# Patient Record
Sex: MALE | Race: WHITE | Employment: UNEMPLOYED | URBAN - METROPOLITAN AREA
[De-identification: names, ages, dates, MRNs, and addresses within clinical notes are randomized per-mention and may not be internally consistent; named-entity substitution may affect disease eponyms.]

---

## 2020-09-04 PROCEDURE — 88307 TISSUE EXAM BY PATHOLOGIST: CPT | Performed by: SURGERY

## 2020-09-08 PROBLEM — K42.9 UMBILICAL HERNIA WITHOUT OBSTRUCTION AND WITHOUT GANGRENE: Status: ACTIVE | Noted: 2020-09-08

## 2021-02-21 PROBLEM — Z00.00 ROUTINE GENERAL MEDICAL EXAMINATION AT A HEALTH CARE FACILITY: Status: ACTIVE | Noted: 2021-02-21

## 2021-02-21 PROBLEM — K62.5 RECTAL BLEEDING: Status: ACTIVE | Noted: 2021-02-21

## 2022-08-25 ENCOUNTER — HOSPITAL ENCOUNTER (OUTPATIENT)
Dept: CT IMAGING | Age: 37
Discharge: HOME | End: 2022-08-25
Attending: INTERNAL MEDICINE
Payer: COMMERCIAL

## 2022-08-25 ENCOUNTER — LAB ENCOUNTER (OUTPATIENT)
Dept: LAB | Age: 37
End: 2022-08-25
Attending: INTERNAL MEDICINE
Payer: COMMERCIAL

## 2022-08-25 ENCOUNTER — HOSPITAL ENCOUNTER (OUTPATIENT)
Facility: HOSPITAL | Age: 37
Setting detail: OBSERVATION
Discharge: HOME | End: 2022-08-26
Attending: EMERGENCY MEDICINE | Admitting: STUDENT IN AN ORGANIZED HEALTH CARE EDUCATION/TRAINING PROGRAM
Payer: COMMERCIAL

## 2022-08-25 ENCOUNTER — OFFICE VISIT (OUTPATIENT)
Dept: INTERNAL MEDICINE CLINIC | Facility: CLINIC | Age: 37
End: 2022-08-25
Payer: COMMERCIAL

## 2022-08-25 VITALS
WEIGHT: 224.81 LBS | TEMPERATURE: 99 F | HEART RATE: 58 BPM | RESPIRATION RATE: 16 BRPM | DIASTOLIC BLOOD PRESSURE: 74 MMHG | HEIGHT: 64 IN | SYSTOLIC BLOOD PRESSURE: 122 MMHG | OXYGEN SATURATION: 98 % | BODY MASS INDEX: 38.38 KG/M2

## 2022-08-25 DIAGNOSIS — R79.89 ELEVATED LFTS: ICD-10-CM

## 2022-08-25 DIAGNOSIS — R10.9 ABDOMINAL PAIN, UNSPECIFIED ABDOMINAL LOCATION: Primary | ICD-10-CM

## 2022-08-25 DIAGNOSIS — R10.9 ABDOMINAL PAIN, UNSPECIFIED ABDOMINAL LOCATION: ICD-10-CM

## 2022-08-25 DIAGNOSIS — R73.03 PREDIABETES: ICD-10-CM

## 2022-08-25 DIAGNOSIS — K37 APPENDICITIS: ICD-10-CM

## 2022-08-25 DIAGNOSIS — K35.30 ACUTE APPENDICITIS WITH LOCALIZED PERITONITIS, WITHOUT PERFORATION, ABSCESS, OR GANGRENE: Primary | ICD-10-CM

## 2022-08-25 PROBLEM — K62.5 RECTAL BLEEDING: Status: RESOLVED | Noted: 2021-02-21 | Resolved: 2022-08-25

## 2022-08-25 PROBLEM — K35.80 ACUTE APPENDICITIS: Status: ACTIVE | Noted: 2022-08-25

## 2022-08-25 PROBLEM — Z00.00 ROUTINE GENERAL MEDICAL EXAMINATION AT A HEALTH CARE FACILITY: Status: RESOLVED | Noted: 2021-02-21 | Resolved: 2022-08-25

## 2022-08-25 PROBLEM — K42.9 UMBILICAL HERNIA WITHOUT OBSTRUCTION AND WITHOUT GANGRENE: Status: RESOLVED | Noted: 2020-09-08 | Resolved: 2022-08-25

## 2022-08-25 LAB
ALBUMIN SERPL-MCNC: 3.9 G/DL (ref 3.4–5)
ALBUMIN/GLOB SERPL: 1 {RATIO} (ref 1–2)
ALP LIVER SERPL-CCNC: 107 U/L
ALT SERPL-CCNC: 43 U/L
ANION GAP SERPL CALC-SCNC: 2 MMOL/L (ref 0–18)
ANTIBODY SCREEN: NEGATIVE
APPEARANCE: CLEAR
AST SERPL-CCNC: 22 U/L (ref 15–37)
BASOPHILS # BLD AUTO: 0.08 X10(3) UL (ref 0–0.2)
BASOPHILS NFR BLD AUTO: 0.7 %
BILIRUB SERPL-MCNC: 0.7 MG/DL (ref 0.1–2)
BILIRUBIN: NEGATIVE
BUN BLD-MCNC: 12 MG/DL (ref 7–18)
CALCIUM BLD-MCNC: 8.9 MG/DL (ref 8.5–10.1)
CARTRIDGE LOT#: 978 NUMERIC
CHLORIDE SERPL-SCNC: 109 MMOL/L (ref 98–112)
CO2 SERPL-SCNC: 28 MMOL/L (ref 21–32)
CREAT BLD-MCNC: 1.15 MG/DL
EOSINOPHIL # BLD AUTO: 0.43 X10(3) UL (ref 0–0.7)
EOSINOPHIL NFR BLD AUTO: 3.7 %
ERYTHROCYTE [DISTWIDTH] IN BLOOD BY AUTOMATED COUNT: 13.2 %
FASTING STATUS PATIENT QL REPORTED: NO
GFR SERPLBLD BASED ON 1.73 SQ M-ARVRAT: 84 ML/MIN/1.73M2 (ref 60–?)
GLOBULIN PLAS-MCNC: 4 G/DL (ref 2.8–4.4)
GLUCOSE (URINE DIPSTICK): NEGATIVE MG/DL
GLUCOSE BLD-MCNC: 98 MG/DL (ref 70–99)
HCT VFR BLD AUTO: 43.8 %
HEMOGLOBIN A1C: 5.9 % (ref 4.3–5.6)
HGB BLD-MCNC: 14.7 G/DL
IMM GRANULOCYTES # BLD AUTO: 0.05 X10(3) UL (ref 0–1)
IMM GRANULOCYTES NFR BLD: 0.4 %
INR BLD: 1.07 (ref 0.85–1.16)
KETONES (URINE DIPSTICK): NEGATIVE MG/DL
LEUKOCYTES: NEGATIVE
LIPASE SERPL-CCNC: 106 U/L (ref 73–393)
LYMPHOCYTES # BLD AUTO: 3.31 X10(3) UL (ref 1–4)
LYMPHOCYTES NFR BLD AUTO: 28.6 %
MCH RBC QN AUTO: 31.9 PG (ref 26–34)
MCHC RBC AUTO-ENTMCNC: 33.6 G/DL (ref 31–37)
MCV RBC AUTO: 95 FL
MONOCYTES # BLD AUTO: 0.97 X10(3) UL (ref 0.1–1)
MONOCYTES NFR BLD AUTO: 8.4 %
MULTISTIX LOT#: NORMAL NUMERIC
NEUTROPHILS # BLD AUTO: 6.74 X10 (3) UL (ref 1.5–7.7)
NEUTROPHILS # BLD AUTO: 6.74 X10(3) UL (ref 1.5–7.7)
NEUTROPHILS NFR BLD AUTO: 58.2 %
NITRITE, URINE: NEGATIVE
OCCULT BLOOD: NEGATIVE
OSMOLALITY SERPL CALC.SUM OF ELEC: 288 MOSM/KG (ref 275–295)
PH, URINE: 6 (ref 4.5–8)
PLATELET # BLD AUTO: 249 10(3)UL (ref 150–450)
POTASSIUM SERPL-SCNC: 3.7 MMOL/L (ref 3.5–5.1)
PROT SERPL-MCNC: 7.9 G/DL (ref 6.4–8.2)
PROTEIN (URINE DIPSTICK): NEGATIVE MG/DL
PROTHROMBIN TIME: 13.9 SECONDS (ref 11.6–14.8)
RBC # BLD AUTO: 4.61 X10(6)UL
RH BLOOD TYPE: POSITIVE
SARS-COV-2 RNA RESP QL NAA+PROBE: NOT DETECTED
SODIUM SERPL-SCNC: 139 MMOL/L (ref 136–145)
SPECIFIC GRAVITY: >=1.03 (ref 1–1.03)
UROBILINOGEN,SEMI-QN: 0.2 MG/DL (ref 0–1.9)
WBC # BLD AUTO: 11.6 X10(3) UL (ref 4–11)

## 2022-08-25 PROCEDURE — 85025 COMPLETE CBC W/AUTO DIFF WBC: CPT

## 2022-08-25 PROCEDURE — 3008F BODY MASS INDEX DOCD: CPT | Performed by: INTERNAL MEDICINE

## 2022-08-25 PROCEDURE — 83036 HEMOGLOBIN GLYCOSYLATED A1C: CPT | Performed by: INTERNAL MEDICINE

## 2022-08-25 PROCEDURE — 85610 PROTHROMBIN TIME: CPT | Performed by: EMERGENCY MEDICINE

## 2022-08-25 PROCEDURE — 74177 CT ABD & PELVIS W/CONTRAST: CPT | Performed by: INTERNAL MEDICINE

## 2022-08-25 PROCEDURE — 99285 EMERGENCY DEPT VISIT HI MDM: CPT

## 2022-08-25 PROCEDURE — 3074F SYST BP LT 130 MM HG: CPT | Performed by: INTERNAL MEDICINE

## 2022-08-25 PROCEDURE — 83690 ASSAY OF LIPASE: CPT

## 2022-08-25 PROCEDURE — 99204 OFFICE O/P NEW MOD 45 MIN: CPT | Performed by: INTERNAL MEDICINE

## 2022-08-25 PROCEDURE — 96374 THER/PROPH/DIAG INJ IV PUSH: CPT

## 2022-08-25 PROCEDURE — 96375 TX/PRO/DX INJ NEW DRUG ADDON: CPT

## 2022-08-25 PROCEDURE — 36415 COLL VENOUS BLD VENIPUNCTURE: CPT

## 2022-08-25 PROCEDURE — 86901 BLOOD TYPING SEROLOGIC RH(D): CPT | Performed by: EMERGENCY MEDICINE

## 2022-08-25 PROCEDURE — 81003 URINALYSIS AUTO W/O SCOPE: CPT | Performed by: INTERNAL MEDICINE

## 2022-08-25 PROCEDURE — 86850 RBC ANTIBODY SCREEN: CPT | Performed by: EMERGENCY MEDICINE

## 2022-08-25 PROCEDURE — 3078F DIAST BP <80 MM HG: CPT | Performed by: INTERNAL MEDICINE

## 2022-08-25 PROCEDURE — 86900 BLOOD TYPING SEROLOGIC ABO: CPT | Performed by: EMERGENCY MEDICINE

## 2022-08-25 PROCEDURE — 80053 COMPREHEN METABOLIC PANEL: CPT

## 2022-08-25 RX ORDER — SODIUM CHLORIDE 9 MG/ML
125 INJECTION, SOLUTION INTRAVENOUS CONTINUOUS
Status: DISCONTINUED | OUTPATIENT
Start: 2022-08-25 | End: 2022-08-26

## 2022-08-25 RX ORDER — MORPHINE SULFATE 4 MG/ML
4 INJECTION, SOLUTION INTRAMUSCULAR; INTRAVENOUS ONCE
Status: COMPLETED | OUTPATIENT
Start: 2022-08-25 | End: 2022-08-25

## 2022-08-26 ENCOUNTER — ANESTHESIA (OUTPATIENT)
Dept: SURGERY | Facility: HOSPITAL | Age: 37
End: 2022-08-26
Payer: COMMERCIAL

## 2022-08-26 ENCOUNTER — ANESTHESIA EVENT (OUTPATIENT)
Dept: SURGERY | Facility: HOSPITAL | Age: 37
End: 2022-08-26
Payer: COMMERCIAL

## 2022-08-26 VITALS
DIASTOLIC BLOOD PRESSURE: 74 MMHG | BODY MASS INDEX: 39.03 KG/M2 | TEMPERATURE: 98 F | WEIGHT: 228.63 LBS | OXYGEN SATURATION: 96 % | HEIGHT: 64 IN | HEART RATE: 62 BPM | SYSTOLIC BLOOD PRESSURE: 116 MMHG | RESPIRATION RATE: 17 BRPM

## 2022-08-26 PROCEDURE — 0DTJ4ZZ RESECTION OF APPENDIX, PERCUTANEOUS ENDOSCOPIC APPROACH: ICD-10-PCS | Performed by: STUDENT IN AN ORGANIZED HEALTH CARE EDUCATION/TRAINING PROGRAM

## 2022-08-26 PROCEDURE — 88304 TISSUE EXAM BY PATHOLOGIST: CPT | Performed by: STUDENT IN AN ORGANIZED HEALTH CARE EDUCATION/TRAINING PROGRAM

## 2022-08-26 RX ORDER — NEOSTIGMINE METHYLSULFATE 1 MG/ML
INJECTION, SOLUTION INTRAVENOUS AS NEEDED
Status: DISCONTINUED | OUTPATIENT
Start: 2022-08-26 | End: 2022-08-26 | Stop reason: SURG

## 2022-08-26 RX ORDER — HYDROMORPHONE HYDROCHLORIDE 1 MG/ML
0.4 INJECTION, SOLUTION INTRAMUSCULAR; INTRAVENOUS; SUBCUTANEOUS EVERY 5 MIN PRN
Status: DISCONTINUED | OUTPATIENT
Start: 2022-08-26 | End: 2022-08-26 | Stop reason: HOSPADM

## 2022-08-26 RX ORDER — HYDROMORPHONE HYDROCHLORIDE 1 MG/ML
0.2 INJECTION, SOLUTION INTRAMUSCULAR; INTRAVENOUS; SUBCUTANEOUS EVERY 5 MIN PRN
Status: DISCONTINUED | OUTPATIENT
Start: 2022-08-26 | End: 2022-08-26 | Stop reason: HOSPADM

## 2022-08-26 RX ORDER — ROCURONIUM BROMIDE 10 MG/ML
INJECTION, SOLUTION INTRAVENOUS AS NEEDED
Status: DISCONTINUED | OUTPATIENT
Start: 2022-08-26 | End: 2022-08-26 | Stop reason: SURG

## 2022-08-26 RX ORDER — SENNOSIDES 8.6 MG
17.2 TABLET ORAL NIGHTLY PRN
Status: DISCONTINUED | OUTPATIENT
Start: 2022-08-26 | End: 2022-08-26

## 2022-08-26 RX ORDER — PROCHLORPERAZINE EDISYLATE 5 MG/ML
5 INJECTION INTRAMUSCULAR; INTRAVENOUS EVERY 8 HOURS PRN
Status: DISCONTINUED | OUTPATIENT
Start: 2022-08-26 | End: 2022-08-26

## 2022-08-26 RX ORDER — BUPIVACAINE HYDROCHLORIDE 2.5 MG/ML
INJECTION, SOLUTION EPIDURAL; INFILTRATION; INTRACAUDAL AS NEEDED
Status: DISCONTINUED | OUTPATIENT
Start: 2022-08-26 | End: 2022-08-26 | Stop reason: HOSPADM

## 2022-08-26 RX ORDER — IBUPROFEN 400 MG/1
400 TABLET ORAL EVERY 6 HOURS PRN
Status: DISCONTINUED | OUTPATIENT
Start: 2022-08-26 | End: 2022-08-26

## 2022-08-26 RX ORDER — SODIUM CHLORIDE, SODIUM LACTATE, POTASSIUM CHLORIDE, CALCIUM CHLORIDE 600; 310; 30; 20 MG/100ML; MG/100ML; MG/100ML; MG/100ML
INJECTION, SOLUTION INTRAVENOUS CONTINUOUS
Status: DISCONTINUED | OUTPATIENT
Start: 2022-08-26 | End: 2022-08-26

## 2022-08-26 RX ORDER — HYDROCODONE BITARTRATE AND ACETAMINOPHEN 5; 325 MG/1; MG/1
1 TABLET ORAL EVERY 6 HOURS PRN
Qty: 10 TABLET | Refills: 0 | Status: SHIPPED | OUTPATIENT
Start: 2022-08-26 | End: 2022-08-26

## 2022-08-26 RX ORDER — DEXAMETHASONE SODIUM PHOSPHATE 4 MG/ML
VIAL (ML) INJECTION AS NEEDED
Status: DISCONTINUED | OUTPATIENT
Start: 2022-08-26 | End: 2022-08-26 | Stop reason: SURG

## 2022-08-26 RX ORDER — KETOROLAC TROMETHAMINE 30 MG/ML
INJECTION, SOLUTION INTRAMUSCULAR; INTRAVENOUS AS NEEDED
Status: DISCONTINUED | OUTPATIENT
Start: 2022-08-26 | End: 2022-08-26 | Stop reason: SURG

## 2022-08-26 RX ORDER — METOCLOPRAMIDE HYDROCHLORIDE 5 MG/ML
INJECTION INTRAMUSCULAR; INTRAVENOUS AS NEEDED
Status: DISCONTINUED | OUTPATIENT
Start: 2022-08-26 | End: 2022-08-26 | Stop reason: SURG

## 2022-08-26 RX ORDER — ONDANSETRON 2 MG/ML
4 INJECTION INTRAMUSCULAR; INTRAVENOUS EVERY 6 HOURS PRN
Status: DISCONTINUED | OUTPATIENT
Start: 2022-08-26 | End: 2022-08-26

## 2022-08-26 RX ORDER — NALOXONE HYDROCHLORIDE 0.4 MG/ML
80 INJECTION, SOLUTION INTRAMUSCULAR; INTRAVENOUS; SUBCUTANEOUS AS NEEDED
Status: DISCONTINUED | OUTPATIENT
Start: 2022-08-26 | End: 2022-08-26 | Stop reason: HOSPADM

## 2022-08-26 RX ORDER — HYDROCODONE BITARTRATE AND ACETAMINOPHEN 5; 325 MG/1; MG/1
1 TABLET ORAL EVERY 6 HOURS PRN
Qty: 10 TABLET | Refills: 0 | Status: SHIPPED | OUTPATIENT
Start: 2022-08-26 | End: 2022-09-02

## 2022-08-26 RX ORDER — HYDROMORPHONE HYDROCHLORIDE 1 MG/ML
0.6 INJECTION, SOLUTION INTRAMUSCULAR; INTRAVENOUS; SUBCUTANEOUS EVERY 5 MIN PRN
Status: DISCONTINUED | OUTPATIENT
Start: 2022-08-26 | End: 2022-08-26 | Stop reason: HOSPADM

## 2022-08-26 RX ORDER — ONDANSETRON 2 MG/ML
4 INJECTION INTRAMUSCULAR; INTRAVENOUS EVERY 6 HOURS PRN
Status: DISCONTINUED | OUTPATIENT
Start: 2022-08-26 | End: 2022-08-26 | Stop reason: HOSPADM

## 2022-08-26 RX ORDER — POLYETHYLENE GLYCOL 3350 17 G/17G
17 POWDER, FOR SOLUTION ORAL DAILY PRN
Status: DISCONTINUED | OUTPATIENT
Start: 2022-08-26 | End: 2022-08-26

## 2022-08-26 RX ORDER — ENOXAPARIN SODIUM 100 MG/ML
40 INJECTION SUBCUTANEOUS DAILY
Status: DISCONTINUED | OUTPATIENT
Start: 2022-08-26 | End: 2022-08-26

## 2022-08-26 RX ORDER — ONDANSETRON 2 MG/ML
INJECTION INTRAMUSCULAR; INTRAVENOUS AS NEEDED
Status: DISCONTINUED | OUTPATIENT
Start: 2022-08-26 | End: 2022-08-26 | Stop reason: SURG

## 2022-08-26 RX ORDER — ACETAMINOPHEN AND CODEINE PHOSPHATE 300; 30 MG/1; MG/1
1 TABLET ORAL ONCE AS NEEDED
Status: DISCONTINUED | OUTPATIENT
Start: 2022-08-26 | End: 2022-08-26 | Stop reason: HOSPADM

## 2022-08-26 RX ORDER — GLYCOPYRROLATE 0.2 MG/ML
INJECTION, SOLUTION INTRAMUSCULAR; INTRAVENOUS AS NEEDED
Status: DISCONTINUED | OUTPATIENT
Start: 2022-08-26 | End: 2022-08-26 | Stop reason: SURG

## 2022-08-26 RX ORDER — ACETAMINOPHEN 500 MG
500 TABLET ORAL EVERY 6 HOURS PRN
Status: DISCONTINUED | OUTPATIENT
Start: 2022-08-26 | End: 2022-08-26

## 2022-08-26 RX ORDER — MIDAZOLAM HYDROCHLORIDE 1 MG/ML
INJECTION INTRAMUSCULAR; INTRAVENOUS AS NEEDED
Status: DISCONTINUED | OUTPATIENT
Start: 2022-08-26 | End: 2022-08-26 | Stop reason: SURG

## 2022-08-26 RX ORDER — HYDROMORPHONE HYDROCHLORIDE 1 MG/ML
0.2 INJECTION, SOLUTION INTRAMUSCULAR; INTRAVENOUS; SUBCUTANEOUS EVERY 2 HOUR PRN
Status: DISCONTINUED | OUTPATIENT
Start: 2022-08-26 | End: 2022-08-26

## 2022-08-26 RX ORDER — PROCHLORPERAZINE EDISYLATE 5 MG/ML
5 INJECTION INTRAMUSCULAR; INTRAVENOUS EVERY 8 HOURS PRN
Status: DISCONTINUED | OUTPATIENT
Start: 2022-08-26 | End: 2022-08-26 | Stop reason: HOSPADM

## 2022-08-26 RX ORDER — HYDROMORPHONE HYDROCHLORIDE 1 MG/ML
0.1 INJECTION, SOLUTION INTRAMUSCULAR; INTRAVENOUS; SUBCUTANEOUS EVERY 2 HOUR PRN
Status: DISCONTINUED | OUTPATIENT
Start: 2022-08-26 | End: 2022-08-26

## 2022-08-26 RX ORDER — HYDROCODONE BITARTRATE AND ACETAMINOPHEN 5; 325 MG/1; MG/1
1 TABLET ORAL EVERY 6 HOURS PRN
Status: DISCONTINUED | OUTPATIENT
Start: 2022-08-26 | End: 2022-08-26

## 2022-08-26 RX ORDER — ACETAMINOPHEN 500 MG
1000 TABLET ORAL ONCE AS NEEDED
Status: DISCONTINUED | OUTPATIENT
Start: 2022-08-26 | End: 2022-08-26 | Stop reason: HOSPADM

## 2022-08-26 RX ORDER — MEPERIDINE HYDROCHLORIDE 25 MG/ML
12.5 INJECTION INTRAMUSCULAR; INTRAVENOUS; SUBCUTANEOUS AS NEEDED
Status: DISCONTINUED | OUTPATIENT
Start: 2022-08-26 | End: 2022-08-26 | Stop reason: HOSPADM

## 2022-08-26 RX ORDER — BISACODYL 10 MG
10 SUPPOSITORY, RECTAL RECTAL
Status: DISCONTINUED | OUTPATIENT
Start: 2022-08-26 | End: 2022-08-26

## 2022-08-26 RX ORDER — SODIUM PHOSPHATE, DIBASIC AND SODIUM PHOSPHATE, MONOBASIC 7; 19 G/133ML; G/133ML
1 ENEMA RECTAL ONCE AS NEEDED
Status: DISCONTINUED | OUTPATIENT
Start: 2022-08-26 | End: 2022-08-26

## 2022-08-26 RX ORDER — SODIUM CHLORIDE, SODIUM LACTATE, POTASSIUM CHLORIDE, CALCIUM CHLORIDE 600; 310; 30; 20 MG/100ML; MG/100ML; MG/100ML; MG/100ML
INJECTION, SOLUTION INTRAVENOUS CONTINUOUS
Status: DISCONTINUED | OUTPATIENT
Start: 2022-08-26 | End: 2022-08-26 | Stop reason: HOSPADM

## 2022-08-26 RX ORDER — LIDOCAINE HYDROCHLORIDE 10 MG/ML
INJECTION, SOLUTION EPIDURAL; INFILTRATION; INTRACAUDAL; PERINEURAL AS NEEDED
Status: DISCONTINUED | OUTPATIENT
Start: 2022-08-26 | End: 2022-08-26 | Stop reason: SURG

## 2022-08-26 RX ORDER — ACETAMINOPHEN AND CODEINE PHOSPHATE 300; 30 MG/1; MG/1
2 TABLET ORAL ONCE AS NEEDED
Status: DISCONTINUED | OUTPATIENT
Start: 2022-08-26 | End: 2022-08-26 | Stop reason: HOSPADM

## 2022-08-26 RX ORDER — CEFAZOLIN SODIUM 1 G/3ML
INJECTION, POWDER, FOR SOLUTION INTRAMUSCULAR; INTRAVENOUS AS NEEDED
Status: DISCONTINUED | OUTPATIENT
Start: 2022-08-26 | End: 2022-08-26 | Stop reason: SURG

## 2022-08-26 RX ORDER — HYDROMORPHONE HYDROCHLORIDE 1 MG/ML
0.4 INJECTION, SOLUTION INTRAMUSCULAR; INTRAVENOUS; SUBCUTANEOUS EVERY 2 HOUR PRN
Status: DISCONTINUED | OUTPATIENT
Start: 2022-08-26 | End: 2022-08-26

## 2022-08-26 RX ADMIN — CEFAZOLIN SODIUM 2 G: 1 INJECTION, POWDER, FOR SOLUTION INTRAMUSCULAR; INTRAVENOUS at 09:20:00

## 2022-08-26 RX ADMIN — ROCURONIUM BROMIDE 5 MG: 10 INJECTION, SOLUTION INTRAVENOUS at 09:07:00

## 2022-08-26 RX ADMIN — METOCLOPRAMIDE HYDROCHLORIDE 10 MG: 5 INJECTION INTRAMUSCULAR; INTRAVENOUS at 09:15:00

## 2022-08-26 RX ADMIN — DEXAMETHASONE SODIUM PHOSPHATE 4 MG: 4 MG/ML VIAL (ML) INJECTION at 09:15:00

## 2022-08-26 RX ADMIN — MIDAZOLAM HYDROCHLORIDE 2 MG: 1 INJECTION INTRAMUSCULAR; INTRAVENOUS at 09:00:00

## 2022-08-26 RX ADMIN — KETOROLAC TROMETHAMINE 30 MG: 30 INJECTION, SOLUTION INTRAMUSCULAR; INTRAVENOUS at 10:16:00

## 2022-08-26 RX ADMIN — LIDOCAINE HYDROCHLORIDE 50 MG: 10 INJECTION, SOLUTION EPIDURAL; INFILTRATION; INTRACAUDAL; PERINEURAL at 09:00:00

## 2022-08-26 RX ADMIN — GLYCOPYRROLATE 0.8 MG: 0.2 INJECTION, SOLUTION INTRAMUSCULAR; INTRAVENOUS at 10:22:00

## 2022-08-26 RX ADMIN — SODIUM CHLORIDE, SODIUM LACTATE, POTASSIUM CHLORIDE, CALCIUM CHLORIDE: 600; 310; 30; 20 INJECTION, SOLUTION INTRAVENOUS at 09:00:00

## 2022-08-26 RX ADMIN — ONDANSETRON 4 MG: 2 INJECTION INTRAMUSCULAR; INTRAVENOUS at 09:15:00

## 2022-08-26 RX ADMIN — NEOSTIGMINE METHYLSULFATE 4 MG: 1 INJECTION, SOLUTION INTRAVENOUS at 10:22:00

## 2022-08-26 RX ADMIN — SODIUM CHLORIDE, SODIUM LACTATE, POTASSIUM CHLORIDE, CALCIUM CHLORIDE: 600; 310; 30; 20 INJECTION, SOLUTION INTRAVENOUS at 10:44:00

## 2022-08-26 RX ADMIN — ROCURONIUM BROMIDE 45 MG: 10 INJECTION, SOLUTION INTRAVENOUS at 09:15:00

## 2022-08-26 NOTE — PLAN OF CARE
NURSING ADMISSION NOTE      Patient admitted to ED with RLQ pain. Pt seen OP 08/25 AM, CT of abdomen showed acute appendicitis. Pt scheduled to have lap appy at [facility] in evening but ate dinner at home @6p and needed to remain NPO for procedure. Pt admitted for obs. Amharic speaking, needs . A/Ox4. RA. No tele orders. Mild pain in abdomen, refused PRN pain meds. Tenderness and swelling noted. Voids to bathroom independently. Noted 3 loose stools at home, R/O cdiff. Temp 100.0 this AM. Prophylactic zosyn Q8. No BC. Surgery planned for this AM. Consent needs to be signed, printed in chart. R AC IVF infusing. NPO (sips w/ meds). Oriented to room. Safety precautions initiated. Bed in low position. Call light in reach. Pt updated on POC, med rec complete.

## 2022-08-26 NOTE — ED INITIAL ASSESSMENT (HPI)
37YM c/c of abd pain Pt state that he was sent over from [facility] with an known appy

## 2022-08-26 NOTE — PROGRESS NOTES
ADDENDUM:     Patient was seen and examined. Continues to have pain in the right lower quadrant. General: AOX3  Abdomen: soft, tender to palapation in the RLQ, no rebound or guarding, no peritoneal signs.          A/P 40year old male with acute appendicitis, plan for laparoscopic appendicectomy possible open today          Helena Pacheco MD  [facility] General Surgery

## 2022-08-26 NOTE — ANESTHESIA PROCEDURE NOTES
Airway  Date/Time: 8/26/2022 9:09 AM  Urgency: elective      General Information and Staff    Patient location during procedure: OR  Anesthesiologist: Nathanael Ortiz MD  Performed: anesthesiologist     Indications and Patient Condition  Indications for airway management: anesthesia  Spontaneous Ventilation: absent  Sedation level: deep  Preoxygenated: yes  Patient position: sniffing  Mask difficulty assessment: 0 - not attempted    Final Airway Details  Final airway type: endotracheal airway      Successful airway: ETT  Cuffed: yes   Successful intubation technique: Video laryngoscopy  Endotracheal tube insertion site: oral  Blade: GlideScope  Blade size: #4  ETT size (mm): 7.0    Cormack-Lehane Classification: grade IIA - partial view of glottis  Placement verified by: chest auscultation and capnometry   Cuff volume (mL): 5  Measured from: lips  ETT to lips (cm): 23  Number of attempts at approach: 1    Additional Comments  Beefy glottis, VC clean, passed smoothly, atraumatically. OGT and soft bite block to molars.

## 2022-08-26 NOTE — ED QUICK NOTES
PT states that he had a full dinner at 1800 hours today (08/25/2022) prior to arrival. OR aware of PT's last meal

## 2022-08-26 NOTE — OPERATIVE REPORT
[facility]  Op Note    Aracelis Small Location: [facility] [id number] MRN [id number]   Admission Date 8/25/2022 Operation Date 8/26/2022   Attending Physician Claire Barrios MD Operating Physician Jennifer Santillan MD   DATE OF OPERATION:  8/25/2022 - 8/26/2022  PREOPERATIVE DIAGNOSIS: Acute appendicitis  POSTOPERATIVE DIAGNOSIS: Acute appendicitis   PROCEDURE PERFORMED: Laparoscopic appendectomy. SURGEON:  Jennifer Santillan MD  ASSISTANT: Sandeep Oh PA-C  FINDINGS:  Acute non perforated appendicitis  ANESTHESIA: General.   SPECIMEN: Appendix to Pathology. BLOOD LOSS: 5 mL. COMPLICATIONS: None. INDICATION FOR OPERATION: The patient is a 40year old male presented with right lower quadrant and subjective fevers. His CT scan showed acute appendicitis. The risks, benefits, and alternatives of this procedure were discussed in detail with the patient. Risks include, but are not limited to, bleeding, wound infection, injury to other intraabdominal contents, and the possibility that this is not in fact appendicitis. The patient was agreeable to proceed with the operation. OPERATIVE TECHNIQUE: After informed consent was obtained, the patient was taken to the operating room and placed in supine position. General anesthesia was induced, a Le catheter was placed, and the abdomen was prepped and draped in usual sterile fashion. A Veress needle was passed into the abdomen at Harris's point in the mid clavicular line 2 cms under the left costal margin, and pneumoperitoneum was instituted to a pressure of 15 without difficulty. A 12 mm visiport was placed in the left flank. The Veress needle was then withdrawn. The abdomen was inspected and found dense adhesions to the site of the previous mesh repair. Under direct vision, a suprapubic 5mm and another in the mid epigastric area clear from the adhesions and the mesh. The patient was placed in trendelenburg and left lateral decubitus position.  The cecum and appendix were identified. The mesoappendix was dissected free from the appendix itself. A fire of the GEO stapler was used to transect the appendix at its base. Another fire was used to transect the mesoappendix. The appendix was then placed into an Endo Catch bag and withdrawn through the 12-mm port site. The staple line was inspected and found to be intact. The 12-mm port was then withdrawn under direct vision, and closed with an 0 vicryl using the [employer]. The skin at all 3 incisions was reapproximated with subcuticular 4-0 Monocryl suture. Half-percent Marcaine with epinephrine was injected into all the incisions to help with postoperative pain control. Skin glue placed across the incisions. The patient tolerated the procedure well, was extubated in the OR, and went to the PACU in good condition.     Tyler Mario MD

## 2022-08-27 NOTE — PLAN OF CARE
NURSING DISCHARGE NOTE    Discharged Home via Ambulatory. Accompanied by Family member, RN and Spouse  Belongings Taken by patient/family. IV removed. Aware of f/u appts, education provided in [address]  regarding when to return to ER and calling MD. Prescription given to spouse. All questions addressed. All belongings taken.

## 2022-08-27 NOTE — PLAN OF CARE
Assumed care of pt @ 0730. Pt is A/Ox 4. Primarily Lithuanian speaking. On RA, VSS, SR on tele. IVF infusing. IV abx given  Tolerating die this evening  Pt states pain is now moderate this evening. Up as tolerated with SBA, ambulating the halls   Intake/outputs WNL. Plan dc after dinner and if pain is tolerable. Updated POC with patient and family. Will continue to monitor.

## 2022-09-02 ENCOUNTER — OFFICE VISIT (OUTPATIENT)
Facility: LOCATION | Age: 37
End: 2022-09-02

## 2022-09-02 VITALS — HEART RATE: 85 BPM | TEMPERATURE: 97 F

## 2022-09-02 DIAGNOSIS — Z98.890 POST-OPERATIVE STATE: ICD-10-CM

## 2022-09-02 DIAGNOSIS — Z90.49 STATUS POST LAPAROSCOPIC APPENDECTOMY: Primary | ICD-10-CM

## 2022-09-06 ENCOUNTER — OFFICE VISIT (OUTPATIENT)
Dept: INTERNAL MEDICINE CLINIC | Facility: CLINIC | Age: 37
End: 2022-09-06
Payer: COMMERCIAL

## 2022-09-06 VITALS
WEIGHT: 222.63 LBS | SYSTOLIC BLOOD PRESSURE: 100 MMHG | OXYGEN SATURATION: 99 % | RESPIRATION RATE: 16 BRPM | TEMPERATURE: 99 F | BODY MASS INDEX: 38.01 KG/M2 | HEIGHT: 64 IN | HEART RATE: 66 BPM | DIASTOLIC BLOOD PRESSURE: 80 MMHG

## 2022-09-06 DIAGNOSIS — Z09 S/P APPENDECTOMY, FOLLOW-UP EXAM: ICD-10-CM

## 2022-09-06 DIAGNOSIS — E66.9 CLASS 2 OBESITY WITHOUT SERIOUS COMORBIDITY WITH BODY MASS INDEX (BMI) OF 38.0 TO 38.9 IN ADULT, UNSPECIFIED OBESITY TYPE: ICD-10-CM

## 2022-09-06 DIAGNOSIS — R06.83 SNORING: ICD-10-CM

## 2022-09-06 DIAGNOSIS — Z91.89 RISK FACTORS FOR OBSTRUCTIVE SLEEP APNEA: ICD-10-CM

## 2022-09-06 DIAGNOSIS — R73.03 PREDIABETES: Primary | ICD-10-CM

## 2022-09-06 DIAGNOSIS — R06.81 WITNESSED EPISODE OF APNEA: ICD-10-CM

## 2022-09-06 PROBLEM — K35.80 ACUTE APPENDICITIS: Status: RESOLVED | Noted: 2022-08-25 | Resolved: 2022-09-06

## 2022-09-06 PROBLEM — K35.30 ACUTE APPENDICITIS WITH LOCALIZED PERITONITIS, WITHOUT PERFORATION, ABSCESS, OR GANGRENE: Status: RESOLVED | Noted: 2022-08-25 | Resolved: 2022-09-06

## 2022-09-06 PROCEDURE — 3079F DIAST BP 80-89 MM HG: CPT | Performed by: INTERNAL MEDICINE

## 2022-09-06 PROCEDURE — 3008F BODY MASS INDEX DOCD: CPT | Performed by: INTERNAL MEDICINE

## 2022-09-06 PROCEDURE — 99214 OFFICE O/P EST MOD 30 MIN: CPT | Performed by: INTERNAL MEDICINE

## 2022-09-06 PROCEDURE — 3074F SYST BP LT 130 MM HG: CPT | Performed by: INTERNAL MEDICINE

## 2022-09-07 ENCOUNTER — PATIENT MESSAGE (OUTPATIENT)
Dept: INTERNAL MEDICINE CLINIC | Facility: CLINIC | Age: 37
End: 2022-09-07

## 2022-09-08 NOTE — TELEPHONE ENCOUNTER
Pt dropped off form from [address], pt would like to  original once is completed, pt mentioned is due asap and would also like forms to be faxed to [phone]. Form placed at dr. Humberto Barbosa folder for fup.

## 2022-09-09 ENCOUNTER — TELEPHONE (OUTPATIENT)
Dept: INTERNAL MEDICINE CLINIC | Facility: CLINIC | Age: 37
End: 2022-09-09

## 2022-09-12 NOTE — TELEPHONE ENCOUNTER
Per DV I made 2 copy's of Disability completed form's one was sent to scan and the other was placed in DV in Basket. Sent fax to [phone]  Received confirmation report. Called patient to inform him that his Disability form's are completed and ready for . Original forms placed in the  accordion so that patient may .

## 2023-10-16 ENCOUNTER — OFFICE VISIT (OUTPATIENT)
Dept: INTERNAL MEDICINE CLINIC | Facility: CLINIC | Age: 38
End: 2023-10-16
Payer: COMMERCIAL

## 2023-10-16 ENCOUNTER — LAB ENCOUNTER (OUTPATIENT)
Dept: LAB | Age: 38
End: 2023-10-16
Attending: INTERNAL MEDICINE
Payer: COMMERCIAL

## 2023-10-16 VITALS
OXYGEN SATURATION: 97 % | TEMPERATURE: 98 F | HEART RATE: 69 BPM | WEIGHT: 233.81 LBS | BODY MASS INDEX: 39.91 KG/M2 | HEIGHT: 64.3 IN | SYSTOLIC BLOOD PRESSURE: 110 MMHG | DIASTOLIC BLOOD PRESSURE: 60 MMHG | RESPIRATION RATE: 15 BRPM

## 2023-10-16 DIAGNOSIS — R06.81 APNEA: ICD-10-CM

## 2023-10-16 DIAGNOSIS — N48.89 PENILE PAIN: ICD-10-CM

## 2023-10-16 DIAGNOSIS — R79.89 ELEVATED LFTS: ICD-10-CM

## 2023-10-16 DIAGNOSIS — Z00.00 ANNUAL PHYSICAL EXAM: Primary | ICD-10-CM

## 2023-10-16 DIAGNOSIS — Z00.00 ANNUAL PHYSICAL EXAM: ICD-10-CM

## 2023-10-16 DIAGNOSIS — N47.1 PHIMOSIS OF PENIS: ICD-10-CM

## 2023-10-16 DIAGNOSIS — R73.03 PREDIABETES: ICD-10-CM

## 2023-10-16 DIAGNOSIS — H11.003 PTERYGIUM OF BOTH EYES: ICD-10-CM

## 2023-10-16 LAB
ALBUMIN SERPL-MCNC: 3.8 G/DL (ref 3.4–5)
ALBUMIN/GLOB SERPL: 1 {RATIO} (ref 1–2)
ALP LIVER SERPL-CCNC: 148 U/L
ALT SERPL-CCNC: 128 U/L
ANION GAP SERPL CALC-SCNC: 7 MMOL/L (ref 0–18)
AST SERPL-CCNC: 45 U/L (ref 15–37)
BASOPHILS # BLD AUTO: 0.09 X10(3) UL (ref 0–0.2)
BASOPHILS NFR BLD AUTO: 0.8 %
BILIRUB SERPL-MCNC: 0.7 MG/DL (ref 0.1–2)
BILIRUB UR QL STRIP.AUTO: NEGATIVE
BUN BLD-MCNC: 13 MG/DL (ref 7–18)
CALCIUM BLD-MCNC: 8.4 MG/DL (ref 8.5–10.1)
CHLORIDE SERPL-SCNC: 103 MMOL/L (ref 98–112)
CHOLEST SERPL-MCNC: 170 MG/DL (ref ?–200)
CLARITY UR REFRACT.AUTO: CLEAR
CO2 SERPL-SCNC: 25 MMOL/L (ref 21–32)
COLOR UR AUTO: YELLOW
CREAT BLD-MCNC: 1.33 MG/DL
EGFRCR SERPLBLD CKD-EPI 2021: 70 ML/MIN/1.73M2 (ref 60–?)
EOSINOPHIL # BLD AUTO: 0.42 X10(3) UL (ref 0–0.7)
EOSINOPHIL NFR BLD AUTO: 3.7 %
ERYTHROCYTE [DISTWIDTH] IN BLOOD BY AUTOMATED COUNT: 12.6 %
FASTING PATIENT LIPID ANSWER: YES
FASTING STATUS PATIENT QL REPORTED: YES
GGT SERPL-CCNC: 189 U/L
GLOBULIN PLAS-MCNC: 4 G/DL (ref 2.8–4.4)
GLUCOSE BLD-MCNC: 281 MG/DL (ref 70–99)
GLUCOSE UR STRIP.AUTO-MCNC: 70 MG/DL
HCT VFR BLD AUTO: 44 %
HDLC SERPL-MCNC: 29 MG/DL (ref 40–59)
HGB BLD-MCNC: 15 G/DL
IMM GRANULOCYTES # BLD AUTO: 0.07 X10(3) UL (ref 0–1)
IMM GRANULOCYTES NFR BLD: 0.6 %
KETONES UR STRIP.AUTO-MCNC: NEGATIVE MG/DL
LDLC SERPL CALC-MCNC: 89 MG/DL (ref ?–100)
LEUKOCYTE ESTERASE UR QL STRIP.AUTO: NEGATIVE
LYMPHOCYTES # BLD AUTO: 3.24 X10(3) UL (ref 1–4)
LYMPHOCYTES NFR BLD AUTO: 28.6 %
MCH RBC QN AUTO: 31.7 PG (ref 26–34)
MCHC RBC AUTO-ENTMCNC: 34.1 G/DL (ref 31–37)
MCV RBC AUTO: 93 FL
MONOCYTES # BLD AUTO: 0.79 X10(3) UL (ref 0.1–1)
MONOCYTES NFR BLD AUTO: 7 %
NEUTROPHILS # BLD AUTO: 6.72 X10 (3) UL (ref 1.5–7.7)
NEUTROPHILS # BLD AUTO: 6.72 X10(3) UL (ref 1.5–7.7)
NEUTROPHILS NFR BLD AUTO: 59.3 %
NITRITE UR QL STRIP.AUTO: NEGATIVE
NONHDLC SERPL-MCNC: 141 MG/DL (ref ?–130)
OSMOLALITY SERPL CALC.SUM OF ELEC: 290 MOSM/KG (ref 275–295)
PH UR STRIP.AUTO: 5.5 [PH] (ref 5–8)
PLATELET # BLD AUTO: 186 10(3)UL (ref 150–450)
POTASSIUM SERPL-SCNC: 3.9 MMOL/L (ref 3.5–5.1)
PROT SERPL-MCNC: 7.8 G/DL (ref 6.4–8.2)
PROT UR STRIP.AUTO-MCNC: 20 MG/DL
RBC # BLD AUTO: 4.73 X10(6)UL
RBC UR QL AUTO: NEGATIVE
SODIUM SERPL-SCNC: 135 MMOL/L (ref 136–145)
SP GR UR STRIP.AUTO: 1.02 (ref 1–1.03)
TRIGL SERPL-MCNC: 314 MG/DL (ref 30–149)
TSI SER-ACNC: 2.3 MIU/ML (ref 0.36–3.74)
UROBILINOGEN UR STRIP.AUTO-MCNC: NORMAL MG/DL
VIT D+METAB SERPL-MCNC: 13.4 NG/ML (ref 30–100)
VLDLC SERPL CALC-MCNC: 51 MG/DL (ref 0–30)
WBC # BLD AUTO: 11.3 X10(3) UL (ref 4–11)

## 2023-10-16 PROCEDURE — 87591 N.GONORRHOEAE DNA AMP PROB: CPT

## 2023-10-16 PROCEDURE — 87086 URINE CULTURE/COLONY COUNT: CPT

## 2023-10-16 PROCEDURE — 85025 COMPLETE CBC W/AUTO DIFF WBC: CPT

## 2023-10-16 PROCEDURE — 80053 COMPREHEN METABOLIC PANEL: CPT

## 2023-10-16 PROCEDURE — 82306 VITAMIN D 25 HYDROXY: CPT

## 2023-10-16 PROCEDURE — 87491 CHLMYD TRACH DNA AMP PROBE: CPT

## 2023-10-16 PROCEDURE — 81001 URINALYSIS AUTO W/SCOPE: CPT

## 2023-10-16 PROCEDURE — 36415 COLL VENOUS BLD VENIPUNCTURE: CPT

## 2023-10-16 PROCEDURE — 82977 ASSAY OF GGT: CPT

## 2023-10-16 PROCEDURE — 80061 LIPID PANEL: CPT

## 2023-10-16 PROCEDURE — 84443 ASSAY THYROID STIM HORMONE: CPT

## 2023-10-17 DIAGNOSIS — R79.89 ELEVATED LFTS: Primary | ICD-10-CM

## 2023-10-17 DIAGNOSIS — D72.829 LEUKOCYTOSIS, UNSPECIFIED TYPE: ICD-10-CM

## 2023-10-17 LAB
C TRACH DNA SPEC QL NAA+PROBE: NEGATIVE
HGBA1C: 9.3 %
N GONORRHOEA DNA SPEC QL NAA+PROBE: NEGATIVE

## 2023-10-17 RX ORDER — ERGOCALCIFEROL 1.25 MG/1
50000 CAPSULE ORAL WEEKLY
Qty: 12 CAPSULE | Refills: 0 | Status: SHIPPED | OUTPATIENT
Start: 2023-10-17 | End: 2024-01-03

## 2023-10-19 ENCOUNTER — OFFICE VISIT (OUTPATIENT)
Dept: SURGERY | Facility: CLINIC | Age: 38
End: 2023-10-19
Payer: COMMERCIAL

## 2023-10-19 DIAGNOSIS — N47.1 PHIMOSIS: Primary | ICD-10-CM

## 2023-10-19 DIAGNOSIS — R82.90 URINE FINDING: ICD-10-CM

## 2023-10-19 LAB
APPEARANCE: CLEAR
BILIRUBIN: NEGATIVE
GLUCOSE (URINE DIPSTICK): 100 MG/DL
LEUKOCYTES: NEGATIVE
MULTISTIX LOT#: ABNORMAL NUMERIC
NITRITE, URINE: NEGATIVE
PH, URINE: 5.5 (ref 4.5–8)
SPECIFIC GRAVITY: 1.02 (ref 1–1.03)
URINE-COLOR: YELLOW
UROBILINOGEN,SEMI-QN: 1 MG/DL (ref 0–1.9)

## 2023-10-19 PROCEDURE — 81002 URINALYSIS NONAUTO W/O SCOPE: CPT | Performed by: UROLOGY

## 2023-10-19 PROCEDURE — 99203 OFFICE O/P NEW LOW 30 MIN: CPT | Performed by: UROLOGY

## 2023-10-19 RX ORDER — BETAMETHASONE DIPROPIONATE 0.5 MG/G
1 CREAM TOPICAL 2 TIMES DAILY
Qty: 45 G | Refills: 1 | Status: SHIPPED | OUTPATIENT
Start: 2023-10-19 | End: 2023-11-16

## 2023-10-19 NOTE — PROGRESS NOTES
Urology Clinic Note - New Patient    Referring Provider:  Julieta Mcgovern MD  [facility]  Prabhu Hankins [facility] [address]     Primary Care Provider:  Julieta Mcgovern MD     Chief Complaint:   Phimosis    Interpretor offered, patient prefers wife who interpreted today       HPI:     Rupa Chavira is a 45year old male with history of uncontrolled diabetes referred for phimosis. Patient with no previous urologic history. He recently saw his PCP and complained of mild penile pain. He is not circumcised and reported some pain with erections. This has been going on for 5 months. On exam he had  phimosis but no lesions. UA showed proteinuria but no infection. STD testing was also negative. He now presents for evaluation. He does have significant diabetes with recent A1c of 9.3  He has no voiding complaints. No pertinent family history. CT of the abdomen pelvis last year was normal from a  perspective. He otherwise has no complaints today. He described today a tight frenulum, this is where his pain mostly is. The remainder of his foreskin is painless and he states he can retract this. He has had no bleeding or drainage from the penis or foreskin. He does have pain at the frenulum during intercourse; no tearing or bleeding. No ED. Urinalysis (clinic dip): Trace blood    PSA:  No results found for: \"PSA\", \"PERCENTPSA\", \"PSAS\", \"PSAULTRA\", \"QPSA\", \"PSATOT\", \"TOTPSADX\", \"TOTPSASCREEN\"   Last Cr was 1.33 done on 10/16/2023.       History:     Past Medical History:   Diagnosis Date    Acute appendicitis 2022    Prediabetes     Rectal bleeding 7356    Umbilical hernia without obstruction and without gangrene 2020       Past Surgical History:   Procedure Laterality Date    HERNIA SURGERY      LAPAROSCOPIC APPENDECTOMY  2022       Family History   Problem Relation Age of Onset    Hypertension Mother     Cataracts Mother     Diabetes Father     Diabetes Brother Social History     Socioeconomic History    Marital status:    Tobacco Use    Smoking status: Every Day     Years: 17     Types: Cigarettes    Smokeless tobacco: Never    Tobacco comments:     Started: 2005: 2-3 cigarettes / day    Vaping Use    Vaping Use: Never used   Substance and Sexual Activity    Alcohol use: Yes     Comment: 10 -12 drinks / month (beer, liquor)    Drug use: Never    Sexual activity: Yes     Partners: Female   Other Topics Concern    Caffeine Concern No    Exercise No    Seat Belt No    Special Diet No    Stress Concern No    Weight Concern Yes       Medications (Active prior to today's visit):  Current Outpatient Medications   Medication Sig Dispense Refill    metFORMIN 500 MG Oral Tab Take 1 tablet (500 mg total) by mouth 2 (two) times daily with meals. 180 tablet 0    ergocalciferol 1.25 MG (52277 UT) Oral Cap Take 1 capsule (50,000 Units total) by mouth once a week for 12 doses. 12 capsule 0       Allergies:  No Known Allergies      Review of Systems:   A comprehensive 10-point review of systems was completed. Pertinent positives and negatives are noted in the the HPI. Physical Exam:   CONSTITUTIONAL: Well developed, well nourished, in no acute distress  NEUROLOGIC: Alert and oriented  HEAD: Normocephalic, atraumatic  ENT: Hearing intact   RESPIRATORY: Normal respiratory effort  SKIN: No evident rashes  ABDOMEN: Soft, non-tender, non-distended  GENITOURINARY: Normal phallus; slight phimosis but easily able to retract foreskin; tight frenulum where patient described he feels the pain; no tearing or bleeding, orthotopic meatus, normal bilateral testicles     No results found. Assessment & Plan:     Ness Vasquez is a 45year old male with history of uncontrolled diabetes referred for phimosis. On exam more consistent with mild phimosis, tight frenulum. .     Discussed the anatomy of the frenulum in detail.   Discussed potential symptoms associated with the frenulum. Patient has slight pain with erections, no bleeding or tearing in this area. I discussed potential treatments including stretching exercises, steroid creams, frenuloplasty, frenulectomy. We also discussed circumcision. For now, patient would like to try steroid cream to loosen the band of tissue. I discussed the proper way to apply the ointment by gently retracting the foreskin and applying to the foreskin and frenulum and then replacing the foreskin. He should do this twice daily for the next 6 weeks. He will then return for exam.  If symptoms have not improved we will offer frenulectomy versus circumcision. Of note, patient with recent uncontrolled diabetes. If circumcision is planned in the future he will need this under better control. Thank you for this consult. I have personally reviewed all relevant medical records, labs, and imaging.          Frederick Kayser, MD  Staff Urologist  [place] [address]  Office: [phone]

## 2023-11-16 ENCOUNTER — HOSPITAL ENCOUNTER (OUTPATIENT)
Age: 38
Discharge: HOME | End: 2023-11-16
Payer: COMMERCIAL

## 2023-11-16 ENCOUNTER — APPOINTMENT (OUTPATIENT)
Dept: CT IMAGING | Age: 38
End: 2023-11-16
Attending: NURSE PRACTITIONER
Payer: COMMERCIAL

## 2023-11-16 VITALS
SYSTOLIC BLOOD PRESSURE: 120 MMHG | OXYGEN SATURATION: 96 % | DIASTOLIC BLOOD PRESSURE: 84 MMHG | TEMPERATURE: 97 F | RESPIRATION RATE: 16 BRPM | HEART RATE: 66 BPM

## 2023-11-16 DIAGNOSIS — K57.92 ACUTE DIVERTICULITIS: Primary | ICD-10-CM

## 2023-11-16 DIAGNOSIS — R10.30 LOWER ABDOMINAL PAIN: ICD-10-CM

## 2023-11-16 LAB
#MXD IC: 1.1 X10ˆ3/UL (ref 0.1–1)
BILIRUB UR QL STRIP: NEGATIVE
BUN BLD-MCNC: 8 MG/DL (ref 7–18)
CHLORIDE BLD-SCNC: 99 MMOL/L (ref 98–112)
CLARITY UR: CLEAR
CO2 BLD-SCNC: 26 MMOL/L (ref 21–32)
COLOR UR: YELLOW
CREAT BLD-MCNC: 0.7 MG/DL
EGFRCR SERPLBLD CKD-EPI 2021: 121 ML/MIN/1.73M2 (ref 60–?)
GLUCOSE BLD-MCNC: 345 MG/DL (ref 70–99)
GLUCOSE UR STRIP-MCNC: 500 MG/DL
HCT VFR BLD AUTO: 47.2 %
HCT VFR BLD CALC: 49 %
HGB BLD-MCNC: 15.5 G/DL
ISTAT IONIZED CALCIUM FOR CHEM 8: 1.16 MMOL/L (ref 1.12–1.32)
KETONES UR STRIP-MCNC: 15 MG/DL
LEUKOCYTE ESTERASE UR QL STRIP: NEGATIVE
LYMPHOCYTES # BLD AUTO: 2.5 X10ˆ3/UL (ref 1–4)
LYMPHOCYTES NFR BLD AUTO: 24.2 %
MCH RBC QN AUTO: 30.8 PG (ref 26–34)
MCHC RBC AUTO-ENTMCNC: 32.8 G/DL (ref 31–37)
MCV RBC AUTO: 93.8 FL (ref 80–100)
MIXED CELL %: 10.3 %
NEUTROPHILS # BLD AUTO: 6.8 X10ˆ3/UL (ref 1.5–7.7)
NEUTROPHILS NFR BLD AUTO: 65.5 %
NITRITE UR QL STRIP: NEGATIVE
PH UR STRIP: 6.5 [PH]
PLATELET # BLD AUTO: 166 X10ˆ3/UL (ref 150–450)
POTASSIUM BLD-SCNC: 4.2 MMOL/L (ref 3.6–5.1)
PROT UR STRIP-MCNC: NEGATIVE MG/DL
RBC # BLD AUTO: 5.03 X10ˆ6/UL
SODIUM BLD-SCNC: 137 MMOL/L (ref 136–145)
SP GR UR STRIP: 1.02
UROBILINOGEN UR STRIP-ACNC: <2 MG/DL
WBC # BLD AUTO: 10.4 X10ˆ3/UL (ref 4–11)

## 2023-11-16 PROCEDURE — 74177 CT ABD & PELVIS W/CONTRAST: CPT | Performed by: NURSE PRACTITIONER

## 2023-11-16 PROCEDURE — 96361 HYDRATE IV INFUSION ADD-ON: CPT

## 2023-11-16 PROCEDURE — 99214 OFFICE O/P EST MOD 30 MIN: CPT

## 2023-11-16 PROCEDURE — 85025 COMPLETE CBC W/AUTO DIFF WBC: CPT | Performed by: NURSE PRACTITIONER

## 2023-11-16 PROCEDURE — 80047 BASIC METABLC PNL IONIZED CA: CPT

## 2023-11-16 PROCEDURE — 81002 URINALYSIS NONAUTO W/O SCOPE: CPT

## 2023-11-16 PROCEDURE — 96374 THER/PROPH/DIAG INJ IV PUSH: CPT

## 2023-11-16 RX ORDER — CEFPODOXIME PROXETIL 200 MG/1
400 TABLET, FILM COATED ORAL 2 TIMES DAILY
Qty: 28 TABLET | Refills: 0 | Status: SHIPPED | OUTPATIENT
Start: 2023-11-16 | End: 2023-11-23

## 2023-11-16 RX ORDER — KETOROLAC TROMETHAMINE 15 MG/ML
15 INJECTION, SOLUTION INTRAMUSCULAR; INTRAVENOUS ONCE
Status: COMPLETED | OUTPATIENT
Start: 2023-11-16 | End: 2023-11-16

## 2023-11-16 RX ORDER — SODIUM CHLORIDE 9 MG/ML
1000 INJECTION, SOLUTION INTRAVENOUS ONCE
Status: COMPLETED | OUTPATIENT
Start: 2023-11-16 | End: 2023-11-16

## 2023-11-16 NOTE — DISCHARGE INSTRUCTIONS
Clear liquid diet for 2 to 3 days. Thereafter advance diet as tolerated. Take the antibiotics as prescribed. Take an over-the-counter probiotic daily while on the antibiotics. May take over-the-counter Motrin and/or Tylenol as needed for pain. Please read the attached discharge instructions. Go to your nearest ER for new or worsening symptoms.

## 2023-11-16 NOTE — ED INITIAL ASSESSMENT (HPI)
C/o  pain under the belly bottom down the pelvis causing urinary frequency since yesterday. Pelvic pain. Bowel movement  \"light green slimy\" color since  yesterday.

## 2025-03-25 ENCOUNTER — HOSPITAL ENCOUNTER (OUTPATIENT)
Age: 40
Discharge: STILL ADMITTED OR ADMINISTRATIVE | End: 2025-03-25
Payer: COMMERCIAL

## 2025-03-25 ENCOUNTER — APPOINTMENT (OUTPATIENT)
Dept: CT IMAGING | Age: 40
End: 2025-03-25
Attending: NURSE PRACTITIONER
Payer: COMMERCIAL

## 2025-03-25 ENCOUNTER — HOSPITAL ENCOUNTER (OUTPATIENT)
Age: 40
Discharge: HOME | End: 2025-03-25
Payer: COMMERCIAL

## 2025-03-25 VITALS
BODY MASS INDEX: 36 KG/M2 | SYSTOLIC BLOOD PRESSURE: 129 MMHG | TEMPERATURE: 99 F | HEART RATE: 75 BPM | WEIGHT: 213 LBS | RESPIRATION RATE: 18 BRPM | OXYGEN SATURATION: 98 % | DIASTOLIC BLOOD PRESSURE: 92 MMHG

## 2025-03-25 DIAGNOSIS — K57.92 ACUTE DIVERTICULITIS: Primary | ICD-10-CM

## 2025-03-25 LAB
#MXD IC: 1.3 X10ˆ3/UL (ref 0.1–1)
BUN BLD-MCNC: 7 MG/DL (ref 7–18)
CHLORIDE BLD-SCNC: 103 MMOL/L (ref 98–112)
CO2 BLD-SCNC: 25 MMOL/L (ref 21–32)
CREAT BLD-MCNC: 1.1 MG/DL
EGFRCR SERPLBLD CKD-EPI 2021: 88 ML/MIN/1.73M2 (ref 60–?)
GLUCOSE BLD-MCNC: 106 MG/DL (ref 70–99)
HCT VFR BLD AUTO: 45.5 %
HCT VFR BLD CALC: 47 %
HGB BLD-MCNC: 15.1 G/DL
ISTAT IONIZED CALCIUM FOR CHEM 8: 1.17 MMOL/L (ref 1.12–1.32)
LYMPHOCYTES # BLD AUTO: 2.4 X10ˆ3/UL (ref 1–4)
LYMPHOCYTES NFR BLD AUTO: 20.2 %
MCH RBC QN AUTO: 31.1 PG (ref 26–34)
MCHC RBC AUTO-ENTMCNC: 33.2 G/DL (ref 31–37)
MCV RBC AUTO: 93.6 FL (ref 80–100)
MIXED CELL %: 11 %
NEUTROPHILS # BLD AUTO: 8.3 X10ˆ3/UL (ref 1.5–7.7)
NEUTROPHILS NFR BLD AUTO: 68.8 %
PLATELET # BLD AUTO: 225 X10ˆ3/UL (ref 150–450)
POTASSIUM BLD-SCNC: 3.8 MMOL/L (ref 3.6–5.1)
RBC # BLD AUTO: 4.86 X10ˆ6/UL
SODIUM BLD-SCNC: 141 MMOL/L (ref 136–145)
WBC # BLD AUTO: 12 X10ˆ3/UL (ref 4–11)

## 2025-03-25 PROCEDURE — 99214 OFFICE O/P EST MOD 30 MIN: CPT

## 2025-03-25 PROCEDURE — 85025 COMPLETE CBC W/AUTO DIFF WBC: CPT | Performed by: NURSE PRACTITIONER

## 2025-03-25 PROCEDURE — 74177 CT ABD & PELVIS W/CONTRAST: CPT | Performed by: NURSE PRACTITIONER

## 2025-03-25 PROCEDURE — 96361 HYDRATE IV INFUSION ADD-ON: CPT

## 2025-03-25 PROCEDURE — 96374 THER/PROPH/DIAG INJ IV PUSH: CPT

## 2025-03-25 PROCEDURE — 80047 BASIC METABLC PNL IONIZED CA: CPT

## 2025-03-25 RX ORDER — SODIUM CHLORIDE 9 MG/ML
1000 INJECTION, SOLUTION INTRAVENOUS ONCE
Status: COMPLETED | OUTPATIENT
Start: 2025-03-25 | End: 2025-03-25

## 2025-03-25 RX ORDER — KETOROLAC TROMETHAMINE 30 MG/ML
15 INJECTION, SOLUTION INTRAMUSCULAR; INTRAVENOUS ONCE
Status: COMPLETED | OUTPATIENT
Start: 2025-03-25 | End: 2025-03-25

## 2025-03-25 RX ORDER — METRONIDAZOLE 500 MG/1
500 TABLET ORAL 3 TIMES DAILY
Qty: 21 TABLET | Refills: 0 | Status: SHIPPED | OUTPATIENT
Start: 2025-03-25 | End: 2025-04-01

## 2025-03-25 RX ORDER — CIPROFLOXACIN 500 MG/1
500 TABLET, FILM COATED ORAL 2 TIMES DAILY
Qty: 14 TABLET | Refills: 0 | Status: SHIPPED | OUTPATIENT
Start: 2025-03-25 | End: 2025-04-01

## 2025-03-25 NOTE — DISCHARGE INSTRUCTIONS
Please take antibiotics as prescribed.  Tylenol and ibuprofen for pain.  Follow closely with your primary.  ER if worse.

## 2025-03-25 NOTE — ED PROVIDER NOTES
Patient Seen in: [facility]      History   No chief complaint on file.    Stated Complaint: Diverticulitis    Subjective:   This is a 39-year-old male with below stated medical history.  Presents to immediate care for left sided abdominal pain.  Symptom started yesterday.  Reports chills but no fevers.  No nausea or vomiting.  No diarrhea or blood in stool.  Feels like previous diverticulitis.  No urinary symptoms.  No treatment attempted prior to arrival.     The history is provided by the patient. A  was used.             Objective:     Past Medical History:    Acute appendicitis    Diverticulitis    Prediabetes    Rectal bleeding    Umbilical hernia without obstruction and without gangrene              Past Surgical History:   Procedure Laterality Date    Hernia surgery      Laparoscopic appendectomy  08/26/2022                Social History     Socioeconomic History    Marital status:    Tobacco Use    Smoking status: Every Day     Types: Cigarettes    Smokeless tobacco: Never    Tobacco comments:     Started: 2005: 2-3 cigarettes / day    Vaping Use    Vaping status: Never Used   Substance and Sexual Activity    Alcohol use: Yes     Comment: 10 -12 drinks / month (beer, liquor)    Drug use: Never    Sexual activity: Yes     Partners: Female   Other Topics Concern    Caffeine Concern No    Exercise No    Seat Belt No    Special Diet No    Stress Concern No    Weight Concern Yes     Social Drivers of Health     Food Insecurity: Low Risk  (4/26/2024)    [facility]     Within the past 12 months, the food you bought just didn't last and you didn't have money to get more.: 3     Within the past 12 months, you worried that your food would run out before you got money to buy more.: 3   Transportation Needs: Not At Risk (4/26/2024)    [facility]eds     In the past 12 months, has lack of reliable  transportation kept you from medical appointments, meetings, work or from getting things needed for daily living?: No   Housing Stability: Not At Risk (4/26/2024)    Received from [facility] Housing     What is your living situation today?: I have a steady place to live     Think about the place you live. Do you have problems with any of the following?: None of the above              Review of Systems   Constitutional:  Positive for chills. Negative for fever.   HENT:  Negative for congestion and sore throat.    Respiratory:  Negative for cough.    Cardiovascular:  Negative for chest pain.   Gastrointestinal:  Positive for abdominal pain. Negative for constipation, diarrhea, nausea and vomiting.   Genitourinary:  Negative for dysuria and flank pain.   Musculoskeletal:  Negative for back pain, neck pain and neck stiffness.   Skin:  Negative for rash.   Neurological:  Negative for headaches.       Positive for stated complaint: Diverticulitis  Other systems are as noted in HPI.  Constitutional and vital signs reviewed.      All other systems reviewed and negative except as noted above.    Physical Exam     ED Triage Vitals [03/25/25 1139]   BP (!) 129/92   Pulse 75   Resp 18   Temp 98.8 °F (37.1 °C)   Temp src Oral   SpO2 98 %   O2 Device None (Room air)       Current Vitals:   Vital Signs  BP: (!) 129/92  Pulse: 75  Resp: 18  Temp: 98.8 °F (37.1 °C)  Temp src: Oral    Oxygen Therapy  SpO2: 98 %  O2 Device: None (Room air)        Physical Exam  Vitals and nursing note reviewed.   Constitutional:       General: He is not in acute distress.     Appearance: Normal appearance. He is not ill-appearing, toxic-appearing or diaphoretic.   HENT:      Head: Normocephalic and atraumatic.      Right Ear: External ear normal.      Left Ear: External ear normal.      Nose: Nose normal.      Mouth/Throat:      Mouth: Mucous membranes are moist.      Pharynx: Oropharynx is clear.   Eyes:      General:         Right eye: No  discharge.         Left eye: No discharge.      Extraocular Movements: Extraocular movements intact.      Conjunctiva/sclera: Conjunctivae normal.   Cardiovascular:      Rate and Rhythm: Normal rate.      Heart sounds: Normal heart sounds. No murmur heard.  Pulmonary:      Effort: Pulmonary effort is normal. No respiratory distress.      Breath sounds: Normal breath sounds. No stridor. No wheezing, rhonchi or rales.   Abdominal:      General: There is no distension.      Palpations: There is no mass.      Tenderness: There is abdominal tenderness. There is no guarding or rebound.      Hernia: No hernia is present.   Musculoskeletal:      Cervical back: Neck supple.      Right lower leg: No edema.      Left lower leg: No edema.   Skin:     General: Skin is warm and dry.      Capillary Refill: Capillary refill takes less than 2 seconds.      Findings: No rash.   Neurological:      Mental Status: He is alert and oriented to person, place, and time.   Psychiatric:         Mood and Affect: Mood normal.         Behavior: Behavior normal.             ED Course     Labs Reviewed   POCT CBC - Abnormal; Notable for the following components:       Result Value    WBC IC 12.0 (*)     # Neutrophil 8.3 (*)     # Mixed Cells 1.3 (*)     All other components within normal limits   POCT ISTAT CHEM8 CARTRIDGE - Abnormal; Notable for the following components:    ISTAT Glucose 106 (*)     All other components within normal limits            Labs, CT abdomen pelvis, IV fluid hydration with dose of toradrol       MDM      Vital signs stable.  Patient is well-appearing and nontoxic looking.  Presents to immediate care for left-sided abdominal pain.    Differential diagnosis includes not limited to diverticulitis, colitis, obstructive uropathy, bowel obstruction, perforation, abscess    Tenderness with guarding to the left side of the abdomen.    CBC shows leukocytosis at 12 with a stable H&H.  I-STAT unremarkable.    CT abdomen pelvis  films interpreted and reviewed by myself.  Results show sigmoid diverticulitis with no evidence of abscess or perforation.    Patient has had diverticulitis multiple times in the past.  He is responded well to course of Cipro/Flagyl.    DC home.  Advised to take antibiotic therapy as prescribed.  Tylenol and ibuprofen for pain.  Close follow-up with primary.  ER precautions reviewed.    Plan of care, assessment, and all discharge instructions given with hospital certified .  Patient verbalized understanding and all questions were answered.    This case was discussed my attending physician Dr. CRISTOFER Hand.  He agrees with plan.  No further recommendations.     Medical Decision Making      Disposition and Plan     Clinical Impression:  1. Acute diverticulitis         Disposition:  Discharge  3/25/2025  1:21 pm    Follow-up:  Lelia Sorto MD  [facility] [address] [address]  [phone]    In 1 week            Medications Prescribed:  Current Discharge Medication List        START taking these medications    Details   ciprofloxacin 500 MG Oral Tab Take 1 tablet (500 mg total) by mouth 2 (two) times daily for 7 days.  Qty: 14 tablet, Refills: 0      metroNIDAZOLE 500 MG Oral Tab Take 1 tablet (500 mg total) by mouth 3 (three) times daily for 7 days.  Qty: 21 tablet, Refills: 0                 Supplementary Documentation:

## 2025-05-09 ENCOUNTER — OFFICE VISIT (OUTPATIENT)
Dept: INTERNAL MEDICINE CLINIC | Facility: CLINIC | Age: 40
End: 2025-05-09
Payer: COMMERCIAL

## 2025-05-09 VITALS
BODY MASS INDEX: 36.81 KG/M2 | DIASTOLIC BLOOD PRESSURE: 78 MMHG | HEIGHT: 64.3 IN | WEIGHT: 215.63 LBS | OXYGEN SATURATION: 99 % | RESPIRATION RATE: 16 BRPM | HEART RATE: 64 BPM | TEMPERATURE: 98 F | SYSTOLIC BLOOD PRESSURE: 122 MMHG

## 2025-05-09 DIAGNOSIS — E55.9 VITAMIN D DEFICIENCY: ICD-10-CM

## 2025-05-09 DIAGNOSIS — Z01.89 ROUTINE LAB DRAW: ICD-10-CM

## 2025-05-09 DIAGNOSIS — K57.92 DIVERTICULITIS: Primary | ICD-10-CM

## 2025-05-09 DIAGNOSIS — E11.65 TYPE 2 DIABETES MELLITUS WITH HYPERGLYCEMIA, UNSPECIFIED WHETHER LONG TERM INSULIN USE (HCC): ICD-10-CM

## 2025-05-09 PROCEDURE — 99214 OFFICE O/P EST MOD 30 MIN: CPT | Performed by: INTERNAL MEDICINE

## 2025-05-09 PROCEDURE — 3074F SYST BP LT 130 MM HG: CPT | Performed by: INTERNAL MEDICINE

## 2025-05-09 PROCEDURE — 3008F BODY MASS INDEX DOCD: CPT | Performed by: INTERNAL MEDICINE

## 2025-05-09 PROCEDURE — 3078F DIAST BP <80 MM HG: CPT | Performed by: INTERNAL MEDICINE

## 2025-05-09 NOTE — PROGRESS NOTES
Subjective:   Byron Chen is a 40 year old male  who presents for Follow - Up         The following individual(s) verbally consented to be recorded using ambient AI listening technology and understand that they can each withdraw their consent to this listening technology at any point by asking the clinician to turn off or pause the recording:    Patient name: Byron Chen        History of Present Illness  Byron Chen is a 40 year old male with diverticulitis and diabetes who presents with recurrent abdominal pain.    He experiences intermittent abdominal pain two to three times a week, with intensity reaching 5 out of 10, resolving after a few hours.  There is no fever, chills, rectal bleeding, nausea, or diarrhea.     He has diabetes, previously managed with metformin, which he discontinued. His blood sugar was high, with an A1c of 9.3. He is managing his condition by drinking more water and reducing sugary foods, resulting in weight loss.    He lost his insurance from work, affecting his ability to seek medical care.    History/Other:    Chief Complaint Reviewed and Verified  No Further Nursing Notes to   Review  Tobacco Reviewed  Allergies Reviewed  Medications Reviewed    Medical History Reviewed  Surgical History Reviewed  Family History   Reviewed  Social History Reviewed         Current Medications[1]    Review of Systems:  Pertinent items are noted in HPI.  A comprehensive 10 point review of systems was completed.  Pertinent positives and negatives noted in the the HPI.        Objective:   /78 (BP Location: Right arm, Patient Position: Sitting, Cuff Size: adult)   Pulse 64   Temp 98 °F (36.7 °C) (Temporal)   Resp 16   Ht 5' 4.3\" (1.633 m)   Wt 215 lb 9.6 oz (97.8 kg)   SpO2 99%   BMI 36.66 kg/m²  Estimated body mass index is 36.66 kg/m² as calculated from the following:    Height as of this encounter: 5' 4.3\" (1.633 m).    Weight as of this encounter: 215 lb 9.6  oz (97.8 kg).  PHYSICAL EXAM:   General: no acute distress   Respiratory: no increased work of breathing; good air exchange; CTAB; no crackles or wheezing   Cardiovascular: RRR; S1, S2; no murmurs; no carotid bruits; no edema   Gastrointestinal: normal bowel sounds; soft; non-distended; non-tender in clinic but per pt report pain comes and goes. Hx of recurrent diverticulitis   Neurological: awake, alert, oriented x3; CNII-XII grossly intact;  Behavioral/Psych: euthymic; appropriate affect     Physical Exam      Assessment & Plan:       Assessment & Plan      Recurrent diverticulitis episodes   Recurrent diverticulitis with intermittent abdominal pain. No severe symptoms currently.   Finished abx course  - Refer to gastroenterologist for colonoscopy to evaluate for underlying causes   - Refer to surgeon to discuss potential surgical intervention for recurrent diverticulitis.  - Order CT scan to re-assess   - Discussed when to seek urgent medical attention; voiced understanding       Type 2 Diabetes Mellitus, uncontrolled   Not on medication   - Order fasting blood tests for glucose, A1c, and cholesterol levels.  - Provide glucometer for home glucose monitoring.  - Advise on dietary modifications to reduce sugar intake and portion control.  -start meds pending labs   - Schedule follow-up in six weeks to reassess diabetes management.    This note was created utilizing [employer] speech recognition software which may lead to grammatical errors/typos.   If any word or phrase is confusing, it is likely due to recognition error. Please ask the provider for clarification.      Lelia Butler MD         [1]   Current Outpatient Medications   Medication Sig Dispense Refill    metFORMIN 500 MG Oral Tab Take 1 tablet (500 mg total) by mouth 2 (two) times daily with meals. (Patient not taking: Reported on 5/9/2025) 180 tablet 0

## 2025-05-13 ENCOUNTER — LAB ENCOUNTER (OUTPATIENT)
Dept: LAB | Age: 40
End: 2025-05-13
Attending: INTERNAL MEDICINE
Payer: COMMERCIAL

## 2025-05-13 DIAGNOSIS — E55.9 VITAMIN D DEFICIENCY: ICD-10-CM

## 2025-05-13 DIAGNOSIS — K57.92 DIVERTICULITIS: ICD-10-CM

## 2025-05-13 DIAGNOSIS — E11.65 TYPE 2 DIABETES MELLITUS WITH HYPERGLYCEMIA, UNSPECIFIED WHETHER LONG TERM INSULIN USE (HCC): ICD-10-CM

## 2025-05-13 DIAGNOSIS — Z01.89 ROUTINE LAB DRAW: ICD-10-CM

## 2025-05-13 LAB
ALBUMIN SERPL-MCNC: 4.8 G/DL (ref 3.2–4.8)
ALBUMIN/GLOB SERPL: 1.7 {RATIO} (ref 1–2)
ALP LIVER SERPL-CCNC: 105 U/L (ref 45–117)
ALT SERPL-CCNC: 22 U/L (ref 10–49)
ANION GAP SERPL CALC-SCNC: 10 MMOL/L (ref 0–18)
AST SERPL-CCNC: 20 U/L (ref ?–34)
BASOPHILS # BLD AUTO: 0.08 X10(3) UL (ref 0–0.2)
BASOPHILS NFR BLD AUTO: 0.8 %
BILIRUB SERPL-MCNC: 0.6 MG/DL (ref 0.3–1.2)
BUN BLD-MCNC: 10 MG/DL (ref 9–23)
CALCIUM BLD-MCNC: 9.2 MG/DL (ref 8.7–10.6)
CHLORIDE SERPL-SCNC: 108 MMOL/L (ref 98–112)
CHOLEST SERPL-MCNC: 152 MG/DL (ref ?–200)
CO2 SERPL-SCNC: 23 MMOL/L (ref 21–32)
CREAT BLD-MCNC: 1.15 MG/DL (ref 0.7–1.3)
CREAT UR-SCNC: 204.7 MG/DL
EGFRCR SERPLBLD CKD-EPI 2021: 83 ML/MIN/1.73M2 (ref 60–?)
EOSINOPHIL # BLD AUTO: 0.39 X10(3) UL (ref 0–0.7)
EOSINOPHIL NFR BLD AUTO: 3.8 %
ERYTHROCYTE [DISTWIDTH] IN BLOOD BY AUTOMATED COUNT: 13.8 %
EST. AVERAGE GLUCOSE BLD GHB EST-MCNC: 126 MG/DL (ref 68–126)
FASTING PATIENT LIPID ANSWER: YES
FASTING STATUS PATIENT QL REPORTED: YES
GLOBULIN PLAS-MCNC: 2.9 G/DL (ref 2–3.5)
GLUCOSE BLD-MCNC: 122 MG/DL (ref 70–99)
HBA1C MFR BLD: 6 % (ref ?–5.7)
HCT VFR BLD AUTO: 45.2 % (ref 39–53)
HDLC SERPL-MCNC: 34 MG/DL (ref 40–59)
HGB BLD-MCNC: 15.2 G/DL (ref 13–17.5)
IMM GRANULOCYTES # BLD AUTO: 0.05 X10(3) UL (ref 0–1)
IMM GRANULOCYTES NFR BLD: 0.5 %
LDLC SERPL CALC-MCNC: 96 MG/DL (ref ?–100)
LYMPHOCYTES # BLD AUTO: 3.31 X10(3) UL (ref 1–4)
LYMPHOCYTES NFR BLD AUTO: 31.9 %
MCH RBC QN AUTO: 30.7 PG (ref 26–34)
MCHC RBC AUTO-ENTMCNC: 33.6 G/DL (ref 31–37)
MCV RBC AUTO: 91.3 FL (ref 80–100)
MICROALBUMIN UR-MCNC: 0.8 MG/DL
MICROALBUMIN/CREAT 24H UR-RTO: 3.9 UG/MG (ref ?–30)
MONOCYTES # BLD AUTO: 1.03 X10(3) UL (ref 0.1–1)
MONOCYTES NFR BLD AUTO: 9.9 %
NEUTROPHILS # BLD AUTO: 5.52 X10 (3) UL (ref 1.5–7.7)
NEUTROPHILS # BLD AUTO: 5.52 X10(3) UL (ref 1.5–7.7)
NEUTROPHILS NFR BLD AUTO: 53.1 %
NONHDLC SERPL-MCNC: 118 MG/DL (ref ?–130)
OSMOLALITY SERPL CALC.SUM OF ELEC: 292 MOSM/KG (ref 275–295)
PLATELET # BLD AUTO: 250 10(3)UL (ref 150–450)
POTASSIUM SERPL-SCNC: 4 MMOL/L (ref 3.5–5.1)
PROT SERPL-MCNC: 7.7 G/DL (ref 5.7–8.2)
RBC # BLD AUTO: 4.95 X10(6)UL (ref 4.3–5.7)
SODIUM SERPL-SCNC: 141 MMOL/L (ref 136–145)
TRIGL SERPL-MCNC: 120 MG/DL (ref 30–149)
TSI SER-ACNC: 1.47 UIU/ML (ref 0.55–4.78)
VIT D+METAB SERPL-MCNC: 14.9 NG/ML (ref 30–100)
VLDLC SERPL CALC-MCNC: 20 MG/DL (ref 0–30)
WBC # BLD AUTO: 10.4 X10(3) UL (ref 4–11)

## 2025-05-13 PROCEDURE — 85025 COMPLETE CBC W/AUTO DIFF WBC: CPT

## 2025-05-13 PROCEDURE — 83036 HEMOGLOBIN GLYCOSYLATED A1C: CPT

## 2025-05-13 PROCEDURE — 82043 UR ALBUMIN QUANTITATIVE: CPT

## 2025-05-13 PROCEDURE — 36415 COLL VENOUS BLD VENIPUNCTURE: CPT

## 2025-05-13 PROCEDURE — 84443 ASSAY THYROID STIM HORMONE: CPT

## 2025-05-13 PROCEDURE — 80061 LIPID PANEL: CPT

## 2025-05-13 PROCEDURE — 80053 COMPREHEN METABOLIC PANEL: CPT

## 2025-05-13 PROCEDURE — 82306 VITAMIN D 25 HYDROXY: CPT

## 2025-05-13 PROCEDURE — 82570 ASSAY OF URINE CREATININE: CPT

## 2025-05-18 ENCOUNTER — HOSPITAL ENCOUNTER (OUTPATIENT)
Dept: CT IMAGING | Facility: HOSPITAL | Age: 40
Discharge: HOME | End: 2025-05-18
Attending: INTERNAL MEDICINE
Payer: COMMERCIAL

## 2025-05-18 ENCOUNTER — HOSPITAL ENCOUNTER (OUTPATIENT)
Dept: CT IMAGING | Facility: HOSPITAL | Age: 40
End: 2025-05-18
Attending: INTERNAL MEDICINE
Payer: COMMERCIAL

## 2025-05-18 DIAGNOSIS — K57.92 DIVERTICULITIS: ICD-10-CM

## 2025-05-18 PROCEDURE — 74177 CT ABD & PELVIS W/CONTRAST: CPT | Performed by: INTERNAL MEDICINE

## 2025-05-29 ENCOUNTER — TELEPHONE (OUTPATIENT)
Facility: CLINIC | Age: 40
End: 2025-05-29

## 2025-05-29 ENCOUNTER — OFFICE VISIT (OUTPATIENT)
Facility: CLINIC | Age: 40
End: 2025-05-29

## 2025-05-29 VITALS
DIASTOLIC BLOOD PRESSURE: 73 MMHG | BODY MASS INDEX: 36.7 KG/M2 | HEIGHT: 64.3 IN | HEART RATE: 60 BPM | SYSTOLIC BLOOD PRESSURE: 109 MMHG | WEIGHT: 215 LBS

## 2025-05-29 DIAGNOSIS — Z87.19 HISTORY OF HEMORRHOIDS: Primary | ICD-10-CM

## 2025-05-29 DIAGNOSIS — Z12.11 COLON CANCER SCREENING: ICD-10-CM

## 2025-05-29 DIAGNOSIS — K57.92 DIVERTICULITIS: ICD-10-CM

## 2025-05-29 DIAGNOSIS — K57.90 DIVERTICULOSIS: ICD-10-CM

## 2025-05-29 DIAGNOSIS — Z12.11 COLON CANCER SCREENING: Primary | ICD-10-CM

## 2025-05-29 PROCEDURE — 99204 OFFICE O/P NEW MOD 45 MIN: CPT

## 2025-05-29 PROCEDURE — 3074F SYST BP LT 130 MM HG: CPT

## 2025-05-29 PROCEDURE — 3008F BODY MASS INDEX DOCD: CPT

## 2025-05-29 PROCEDURE — 3078F DIAST BP <80 MM HG: CPT

## 2025-05-29 RX ORDER — LANCETS 33 GAUGE
1 EACH MISCELLANEOUS DAILY
COMMUNITY
Start: 2025-05-11

## 2025-05-29 RX ORDER — BLOOD-GLUCOSE METER
1 EACH MISCELLANEOUS DAILY
COMMUNITY
Start: 2025-05-11

## 2025-05-29 RX ORDER — BLOOD SUGAR DIAGNOSTIC
STRIP MISCELLANEOUS
COMMUNITY
Start: 2025-05-11

## 2025-05-29 NOTE — PATIENT INSTRUCTIONS
# diverticulosis/diverticulitis   - when flare up onsets, avoid roughage/high fiber; follow liquid diet  - avoid constipation, continue to take Charlee as needed    1. Schedule colonoscopy  with MAC w/ General pool MD [Diagnosis: diverticulitis/diverticulosis, colon screening]    2.  bowel prep from pharmacy: Prep: Trilyte Prep       For cardiology patients and patients on blood thinners:  Please contact your cardiology clinic for clearance to proceed with the endoscopic procedure. If you are on blood thinners, please also confirm with your cardiologic clinic that you are able to hold the blood thinner per our recommendations.\"    BLOOD THINNER ORDERS:  -Hold for 48 hours (Xarelto, Eliquis, Pradaxa, Savaysa)  -Hold for 3 days (Pletal)  -Hold for 5 days (Coumadin, Plavix, Brilinta, Aggrenox)  -Hold for 7 days (Effient)     For endocrinology insulin patients:    Please contact your endocrinology clinic for insulin adjustment orders prior to your endoscopic procedure.    4. Read all bowel prep instructions carefully    5. AVOID seeds, nuts, popcorn, raw fruits and vegetables (cooked is okay) for 5 days before procedure    6.   If you start any NEW medication after your visit today, please notify us. Certain medications will need to be held before the procedure, or the procedure cannot be performed.     >>>Please note: if you were prescribed Suprep for the bowel prep and it is too expensive or not covered by insurance, it is okay to substitute Trilyte (or any similar generic prep). This can be done by notifying the pharmacy or calling our office.     ENDOSCOPIC RISK BENEFIT DISCUSSION: I described the procedure in great detail with the patient. I discussed the risks and benefits, including but not limited to: bleeding, perforation, infection, anesthesia complications, and even death. Patient will be NPO after midnight and will have a person physically present at time of pick-up to drive patient home. Patient  verbalized understanding and agrees to proceed with procedure as planned.

## 2025-05-29 NOTE — H&P
[facility] - Gastroenterology                                                                                                                 Requesting physician or provider: Lelia Butler MD    Chief Complaint   Patient presents with    Consult     Diverticulitis        HPI:   Byron Chen is a 40 year old year-old male with history of diabetes, hemorrhoids. Pt daughter, neptali, presents to assist with interpretation.   Pt presents for evaluation of diverticulitis, pt states he has been had problems with diverticulitis since last yr. Last yr pt was hospitalized for 5 days for diverticulitis, Had another episode recently in March, for which pt saw pcp was given antibiotics after CT was completed. Repeat CT in 5/18- showed improvement in diverticulitis.   Currently pt denies pain. However states that he had diarrhea yesterday- 6 loose stools. Today no bm. Sine pt found of about hemorrhoids he has increased fiber diet as well as taking a Charlee probiotic daily.    Denies melena, hematochezia, hematemesis, abd pain, abd surgery, loss of appetite, changes in stool or bowel habits, N/V/D, weight gain/loss    Prior endoscopies:  2021 sigmoidoscopy  FINDINGS: Sigmoidoscope advanced into the rectum. There was some stool present. There was more stool present up into the sigmoid colon. I was able to carefully get beyond this to proximal sigmoid colon, but there was a lot of stool. The mucosal pattern was normal. Retroversion in the rectal vault revealed internal hemorrhoids.     Soc:  -1-2 cig daily smoking  -occasional Etoh    Wt Readings from Last 6 Encounters:   05/29/25 215 lb (97.5 kg)   05/09/25 215 lb 9.6 oz (97.8 kg)   03/25/25 213 lb (96.6 kg)   10/16/23 233 lb 12.8 oz (106.1 kg)   09/06/22 222 lb 9.6 oz (101 kg)   08/26/22 228 lb 9.9 oz (103.7 kg)        History, Medications, Allergies, ROS:      Past Medical History[1]   Past Surgical History[2]   Family Hx:  Family History[3]   Social History: Short Social Hx on File[4]     Medications (Active prior to today's visit):  Current Medications[5]    Allergies:  Allergies[6]    ROS:   CONSTITUTIONAL:  negative for fevers, rigors  EYES:  negative for diplopia   RESPIRATORY:  negative for severe shortness of breath  CARDIOVASCULAR:  negative for crushing sub-sternal chest pain  GASTROINTESTINAL:  see HPI  GENITOURINARY:  negative for dysuria or gross hematuria  INTEGUMENT/BREAST:  SKIN:  negative for jaundice   ALLERGIC/IMMUNOLOGIC:  negative for hay fever  ENDOCRINE:  negative for cold intolerance and heat intolerance  MUSCULOSKELETAL:  negative for joint effusion/severe erythema  BEHAVIOR/PSYCH:  negative for psychotic behavior      PHYSICAL EXAM:   Blood pressure 109/73, pulse 60, height 5' 4.3\" (1.633 m), weight 215 lb (97.5 kg).    Gen- Patient appears comfortable and in no acute discomfort  HEENT: the sclera appears anicteric, oropharynx clear, mucus membranes appear moist  CV- regular rate and rhythm, the extremities are warm and well perfused   Lung- Moves air well; No labored breathing  Abdomen- soft, non-tender exam in all quadrants without rigidity or guarding, non-distended, no abnormal bowel sounds noted, no masses are palpated  Skin- No jaundice  Ext: no cyanosis, clubbing or edema is evident.   Neuro- Alert and interactive, and gross movements of extremities normal  Psych - appropriate, non-agitated    Labs/Imaging:     Patient's pertinent labs and imaging were reviewed and discussed with patient today.      Lab Results   Component Value Date     (H) 05/13/2025     (H) 10/16/2023    GLU 98 08/25/2022     05/13/2025     (L) 10/16/2023     08/25/2022    K 4.0 05/13/2025    K 3.9 10/16/2023    K 3.7 08/25/2022     05/13/2025     10/16/2023     08/25/2022    CO2 23.0 05/13/2025    CO2 25.0 10/16/2023    CO2 28.0 08/25/2022    ANIONGAP 10 05/13/2025    ANIONGAP 7  10/16/2023    ANIONGAP 2 08/25/2022    BUN 10 05/13/2025    BUN 13 10/16/2023    BUN 12 08/25/2022    CREATSERUM 1.15 05/13/2025    CREATSERUM 1.33 (H) 10/16/2023    CREATSERUM 1.15 08/25/2022    BUNCREA 15.0 01/21/2021    CA 9.2 05/13/2025    CA 8.4 (L) 10/16/2023    CA 8.9 08/25/2022    OSMOCALC 292 05/13/2025    OSMOCALC 290 10/16/2023    OSMOCALC 288 08/25/2022    EGFRCR 83 05/13/2025    EGFRCR 88 03/25/2025    EGFRCR 121 11/16/2023    ALT 22 05/13/2025     (H) 10/16/2023    ALT 43 08/25/2022    AST 20 05/13/2025    AST 45 (H) 10/16/2023    AST 22 08/25/2022    ALKPHO 105 05/13/2025    ALKPHO 148 (H) 10/16/2023    ALKPHO 107 08/25/2022    BILT 0.6 05/13/2025    BILT 0.7 10/16/2023    BILT 0.7 08/25/2022    TP 7.7 05/13/2025    TP 7.8 10/16/2023    TP 7.9 08/25/2022    ALB 4.8 05/13/2025    ALB 3.8 10/16/2023    ALB 3.9 08/25/2022    GLOBULIN 2.9 05/13/2025    GLOBULIN 4.0 10/16/2023    GLOBULIN 4.0 08/25/2022    ELECTAG 1.7 05/13/2025    ELECTAG 1.0 10/16/2023    ELECTAG 1.0 08/25/2022    FASTING Yes 05/13/2025    FASTING Yes 05/13/2025    FASTING Yes 10/16/2023    FASTING Yes 10/16/2023       Lab Results   Component Value Date    RBC 4.95 05/13/2025    RBC 4.73 10/16/2023    RBC 4.61 08/25/2022    HGB 15.2 05/13/2025    HGB 15.0 10/16/2023    HGB 14.7 08/25/2022    HCT 45.2 05/13/2025    HCT 44.0 10/16/2023    HCT 43.8 08/25/2022    MCV 91.3 05/13/2025    MCV 93.6 03/25/2025    MCV 93.8 11/16/2023    MCH 30.7 05/13/2025    MCH 31.7 10/16/2023    MCH 31.9 08/25/2022    MCHC 33.6 05/13/2025    MCHC 33.2 03/25/2025    MCHC 32.8 11/16/2023    RDW 13.8 05/13/2025    RDW 12.6 10/16/2023    RDW 13.2 08/25/2022    NEPRELIM 5.52 05/13/2025    NEPRELIM 6.72 10/16/2023    NEPRELIM 6.74 08/25/2022    WBC 10.4 05/13/2025    WBC 11.3 (H) 10/16/2023    WBC 11.6 (H) 08/25/2022    .0 05/13/2025    .0 10/16/2023    .0 08/25/2022      Impression 5/18/25   CONCLUSION:       There continues to be subtle  stranding about the mid/distal sigmoid colon, though significantly improved compared to 03/25/2025.  This may represent residual or recurrent diverticular and/or colonic inflammation.  No evidence of diverticular perforation  or regional abscess formation.       ASSESSMENT/PLAN:   Byron Chen is a 40 year old year-old male with history of diabetes, hemorrhoids. Pt daughter, neptali, presents to assist with interpretation.   Pt presents for evaluation of diverticulitis, pt states he has been had problems with diverticulitis since last yr. Last yr pt was hospitalized for 5 days for diverticulitis, Had another episode recently in March, for which pt saw pcp was given antibiotics after CT was completed. Repeat CT in 5/18- showed improvement in diverticulitis.   Currently pt denies pain. However states that he had diarrhea yesterday- 6 loose stools. Today no bm. Sine pt found of about hemorrhoids he has increased fiber diet as well as taking a Charlee probiotic daily.    Hx of diverticulitis- x2 episodes with no colonoscopy.      1. History of hemorrhoids    2. Diverticulitis    3. Colon cancer screening      Recommendations:    # diverticulosis/diverticulitis   - when flare up onsets, avoid roughage/high fiber; follow liquid diet  - avoid constipation, continue to take Charlee as needed    1. Schedule colonoscopy  with MAC w/ General pool MD [Diagnosis: diverticulitis/diverticulosis, colon screening]    2.  bowel prep from pharmacy: Prep: Trilyte Prep    Colonoscopy consent: I have discussed the risks, benefits, and alternatives to colonoscopy with the patient [who demonstrated understanding], including but not limited to the risks of bleeding, infection, pain, death, as well as the risks of anesthesia and perforation all leading to prolonged hospitalization, surgical intervention, or even death. I also specifically mentioned the miss rate of colonoscopy of 5-10% in the best of all circumstances. All  questions were answered to the patient’s satisfaction. The patient signed informed consent and elected to proceed with colonoscopy with intervention [i.e. polypectomy, stent placement, etc.] as indicated.         Orders This Visit:  No orders of the defined types were placed in this encounter.      Meds This Visit:  Requested Prescriptions     Signed Prescriptions Disp Refills    polyethylene glycol, PEG 3350-KCl-NaBcb-NaCl-NaSulf, 236 g Oral Recon Soln 4000 mL 0     Sig: Take 4,000 mL by mouth once for 1 dose. As directed by GI clinic instructions.       Imaging & Referrals:  None         Lashawn Springerjerson, APRN   5/29/2025          [1]   Past Medical History:   Acute appendicitis    Diverticulitis    Prediabetes    Rectal bleeding    Umbilical hernia without obstruction and without gangrene   [2]   Past Surgical History:  Procedure Laterality Date    Appendectomy      Hernia surgery      Laparoscopic appendectomy  08/26/2022   [3]   Family History  Problem Relation Age of Onset    Hypertension Mother     Cataracts Mother     Diabetes Father     Diabetes Brother    [4]   Social History  Socioeconomic History    Marital status:    Tobacco Use    Smoking status: Every Day     Types: Cigarettes    Smokeless tobacco: Never    Tobacco comments:     Started: 2005: 2-3 cigarettes / day    Vaping Use    Vaping status: Never Used   Substance and Sexual Activity    Alcohol use: Yes     Comment: 10 -12 drinks / month (beer, liquor)    Drug use: Never    Sexual activity: Yes     Partners: Female   Other Topics Concern    Caffeine Concern No    Exercise No    Seat Belt No    Special Diet No    Stress Concern No    Weight Concern Yes     Social Drivers of Health     Food Insecurity: Low Risk  (4/26/2024)    Received from [facility] Food Security     Within the past 12 months, the food you bought just didn't last and you didn't have money to get more.: 3     Within the past 12 months, you worried that your food would  run out before you got money to buy more.: 3   Transportation Needs: Not At Risk (4/26/2024)    Received from [facility] Transportation Needs     In the past 12 months, has lack of reliable transportation kept you from medical appointments, meetings, work or from getting things needed for daily living?: No   Stress: Not on File (3/26/2024)    Received from [facility] [employer]    Stress     Stress: 0   Housing Stability: Not At Risk (4/26/2024)    Received from [facility] Housing     What is your living situation today?: I have a steady place to live     Think about the place you live. Do you have problems with any of the following?: None of the above   [5]   Current Outpatient Medications   Medication Sig Dispense Refill    Lancets (ONETOUCH DELICA PLUS [id number]) Does not apply Misc 1 strip by In Vitro route daily.      ONETOUCH ULTRA TEST In Vitro Strip AS DIRECTED TO TEST ONCE DAILY      Blood Glucose Monitoring Suppl (ONETOUCH ULTRA 2) w/Device Does not apply Kit 1 strip by In Vitro route daily.      polyethylene glycol, PEG 3350-KCl-NaBcb-NaCl-NaSulf, 236 g Oral Recon Soln Take 4,000 mL by mouth once for 1 dose. As directed by GI clinic instructions. 4000 mL 0    amoxicillin clavulanate 875-125 MG Oral Tab Take 1 tablet by mouth 2 (two) times daily for 10 days. 20 tablet 0    ergocalciferol 1.25 MG (19757 UT) Oral Cap Take 1 capsule (50,000 Units total) by mouth once a week for 12 doses. 12 capsule 0    atorvastatin 20 MG Oral Tab Take 1 tablet (20 mg total) by mouth nightly. 90 tablet 0   [6] No Known Allergies

## 2025-05-29 NOTE — TELEPHONE ENCOUNTER
Patient was seen in office today with MELODY Ames and was given orders to schedule. Please call patient to schedule his/her procedure with the orders given below. Patient was given the phone number and prep instructions at the time of the office visit. The prep instructions was also explained to the patient at the time of the visit. The patient verbalized understanding the prep and that a GI  will call him/her for the procedure.  If patient calls before the 1 week turnaround please schedule with the orders given below, thank you!    Orders from MELODY Ames    1. Schedule colonoscopy  with MAC w/ General pool MD [Diagnosis: diverticulitis/diverticulosis, colon screening]     2.  bowel prep from pharmacy: Prep: Trilyte Prep        For cardiology patients and patients on blood thinners:  Please contact your cardiology clinic for clearance to proceed with the endoscopic procedure. If you are on blood thinners, please also confirm with your cardiologic clinic that you are able to hold the blood thinner per our recommendations.\"     BLOOD THINNER ORDERS:  -Hold for 48 hours (Xarelto, Eliquis, Pradaxa, Savaysa)  -Hold for 3 days (Pletal)  -Hold for 5 days (Coumadin, Plavix, Brilinta, Aggrenox)  -Hold for 7 days (Effient)      For endocrinology insulin patients:     Please contact your endocrinology clinic for insulin adjustment orders prior to your endoscopic procedure.     4. Read all bowel prep instructions carefully     5. AVOID seeds, nuts, popcorn, raw fruits and vegetables (cooked is okay) for 5 days before procedure     6.   If you start any NEW medication after your visit today, please notify us. Certain medications will need to be held before the procedure, or the procedure cannot be performed.

## 2025-06-02 NOTE — TELEPHONE ENCOUNTER
Scheduled for:  Colonoscopy 39934  Provider Name:  Dr. Franco  Date:  9/12/2025  Location:   [facility]  Sedation:  MAC  Time:  11:35 AM - Patient is aware NE will call the day before and day of procedure.  Prep:  Trilyte  Meds/Allergies Reconciled?:  APN reviewed   Diagnosis with codes:  Colon cancer screening Z12.11; Diverticulitis K57.92; Diverticulosis K57.90  Was patient informed to call insurance with codes (Y/N):  Yes, I confirmed [facility] insurance with the patient.   Referral sent?:  Referral was sent at the time of electronic surgical scheduling.   EM or [facility] notified?:  I sent an electronic request to Endo Scheduling and received a confirmation today.   Medication Orders:  If prescribed, hold Metformin the day before and day of procedure.  Misc Orders:  N/A     Further instructions given by staff:  I discussed the prep instructions with the patient which he verbally understood and is aware that I will send the instructions today.

## 2025-06-23 ENCOUNTER — OFFICE VISIT (OUTPATIENT)
Dept: INTERNAL MEDICINE CLINIC | Facility: CLINIC | Age: 40
End: 2025-06-23
Payer: COMMERCIAL

## 2025-06-23 VITALS
WEIGHT: 217.63 LBS | OXYGEN SATURATION: 97 % | SYSTOLIC BLOOD PRESSURE: 130 MMHG | DIASTOLIC BLOOD PRESSURE: 76 MMHG | HEART RATE: 78 BPM | TEMPERATURE: 98 F | BODY MASS INDEX: 37 KG/M2

## 2025-06-23 DIAGNOSIS — Z23 IMMUNIZATION DUE: ICD-10-CM

## 2025-06-23 DIAGNOSIS — E78.5 HYPERLIPIDEMIA, UNSPECIFIED HYPERLIPIDEMIA TYPE: ICD-10-CM

## 2025-06-23 DIAGNOSIS — Z87.19 HISTORY OF DIVERTICULITIS: ICD-10-CM

## 2025-06-23 DIAGNOSIS — R61 SWEATY SKIN: ICD-10-CM

## 2025-06-23 DIAGNOSIS — E55.9 VITAMIN D DEFICIENCY: ICD-10-CM

## 2025-06-23 DIAGNOSIS — E11.65 TYPE 2 DIABETES MELLITUS WITH HYPERGLYCEMIA, WITHOUT LONG-TERM CURRENT USE OF INSULIN (HCC): Primary | ICD-10-CM

## 2025-06-23 DIAGNOSIS — B35.1 ONYCHOMYCOSIS: ICD-10-CM

## 2025-06-23 PROBLEM — R73.03 PREDIABETES: Status: RESOLVED | Noted: 2023-10-16 | Resolved: 2025-06-23

## 2025-06-23 PROCEDURE — 90677 PCV20 VACCINE IM: CPT | Performed by: INTERNAL MEDICINE

## 2025-06-23 PROCEDURE — 3044F HG A1C LEVEL LT 7.0%: CPT | Performed by: INTERNAL MEDICINE

## 2025-06-23 PROCEDURE — G2211 COMPLEX E/M VISIT ADD ON: HCPCS | Performed by: INTERNAL MEDICINE

## 2025-06-23 PROCEDURE — 99214 OFFICE O/P EST MOD 30 MIN: CPT | Performed by: INTERNAL MEDICINE

## 2025-06-23 PROCEDURE — 3075F SYST BP GE 130 - 139MM HG: CPT | Performed by: INTERNAL MEDICINE

## 2025-06-23 PROCEDURE — 3078F DIAST BP <80 MM HG: CPT | Performed by: INTERNAL MEDICINE

## 2025-06-23 PROCEDURE — 90471 IMMUNIZATION ADMIN: CPT | Performed by: INTERNAL MEDICINE

## 2025-06-23 PROCEDURE — 3061F NEG MICROALBUMINURIA REV: CPT | Performed by: INTERNAL MEDICINE

## 2025-06-23 RX ORDER — POLYETHYLENE GLYCOL-3350 AND ELECTROLYTES 236; 6.74; 5.86; 2.97; 22.74 G/274.31G; G/274.31G; G/274.31G; G/274.31G; G/274.31G
POWDER, FOR SOLUTION ORAL
COMMUNITY
Start: 2025-05-29

## 2025-06-23 RX ORDER — ATORVASTATIN CALCIUM 20 MG/1
20 TABLET, FILM COATED ORAL NIGHTLY
Qty: 90 TABLET | Refills: 3 | Status: SHIPPED | OUTPATIENT
Start: 2025-06-23

## 2025-06-23 NOTE — PROGRESS NOTES
Subjective:   Byron Chen is a 40 year old male  who presents for Follow - Up (6 week follow up /)       The following individual(s) verbally consented to be recorded using ambient AI listening technology and understand that they can each withdraw their consent to this listening technology at any point by asking the clinician to turn off or pause the recording:    Patient name: Byron Chen        History of Present Illness  Byron Chen is a 40 year old male with diabetes who presents for a follow-up visit.    His diabetes is better controlled with recent dietary changes, including reducing soda, sugar, bread, tortillas, and rice, and increasing water intake. Blood sugar levels are around 109-110 mg/dL. He has also lost weight, which  has contributed to better control of his diabetes. He previously had an elevated A1c at 9.3%.    He has noticed increased sweating and the development of small, itchy bumps on his back, which are irritated by sweat. 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 Discussed supportive care    Denies angina/sob    History/Other:    Chief Complaint Reviewed and Verified  Nursing Notes Reviewed and   Verified  Tobacco Reviewed  Allergies Reviewed  Medications Reviewed         Current Medications[1]    Review of Systems:  Pertinent items are noted in HPI.  A comprehensive 10 point review of systems was completed.  Pertinent positives and negatives noted in the the HPI.        Objective:   /76   Pulse 78   Temp 97.7 °F (36.5 °C) (Temporal)   Wt 217 lb 9.6 oz (98.7 kg)   SpO2 97%   BMI 37.00 kg/m²  Estimated body mass index is 37 kg/m² as calculated from the following:    Height as of 5/29/25: 5' 4.3\" (1.633 m).    Weight as of this encounter: 217 lb 9.6 oz (98.7 kg).  PHYSICAL EXAM:   General: no acute distress   Respiratory: no increased work of breathing; good air exchange;  CTAB; no crackles or wheezing   Cardiovascular: RRR; S1, S2; no murmurs; no edema   Neurological: awake, alert, oriented x3; CNII-XII grossly intact;  Behavioral/Psych: euthymic; appropriate affect   Skin: mild irritation on back from sweat. No acute infection   Bilateral barefoot skin diabetic exam is normal. R toenail fungus on 1st digit  Bilateral monofilament/sensation of both feet is normal.  Pulsation pedal pulse exam of both lower legs/feet is normal as well.        Physical Exam      Assessment & Plan:       Assessment & Plan  Type 2 Diabetes Mellitus  Diabetes control improved with dietary changes.   - Continue dietary modifications.  - Check fasting blood glucose and A1c in mid-August.  - Schedule follow-up in September after blood tests.  - Administer pneumococcal vaccine today.  - Refer to ophthalmologist for eye exam.  - Refer to podiatrist for foot care.    Hypertension  - Monitor blood pressure at home.  - Consider antihypertensive medication if consistently above 130/80 mmHg.  - Re-evaluate blood pressure in September.    Hx of diverticulitis  -has appt for colonoscopy     Skin irritation due to sweating  Sweating and bumps on back likely due to heat and moisture exposure. Symptoms include itching and irritation.  - Advise changing shirts during the day.  - Consider using talcum powder.  - Recommend cotton or moisture-wicking shirts.    HLD- statin and follow-up labs    General Health Maintenance  Routine health maintenance discussed.  - Administer pneumococcal vaccine today.  - Schedule blood tests in mid-August for cholesterol, liver, kidney function, and vitamin D levels.    Vitamin d deficiency- supplements; follow-up labs       This note was created utilizing [employer] speech recognition software which may lead to grammatical errors/typos.   If any word or phrase is confusing, it is likely due to recognition error. Please ask the provider for clarification.      Lelia Butler MD       [1]    Current Outpatient Medications   Medication Sig Dispense Refill    GAVILYTE-G 236 g Oral Recon Soln FOLLOW PACKAGE DIRECTIONS BY GI CLINIC INSTRUCTIONS      atorvastatin 20 MG Oral Tab Take 1 tablet (20 mg total) by mouth nightly. 90 tablet 3    ergocalciferol 1.25 MG (39464 UT) Oral Cap Take 1 capsule (50,000 Units total) by mouth once a week for 12 doses. 12 capsule 0    Lancets (ONETOUCH DELICA PLUS [id number]) Does not apply Misc 1 strip by In Vitro route daily.      ONETOUCH ULTRA TEST In Vitro Strip AS DIRECTED TO TEST ONCE DAILY      Blood Glucose Monitoring Suppl (ONETOUCH ULTRA 2) w/Device Does not apply Kit 1 strip by In Vitro route daily.

## (undated) DEVICE — SUT MONOCRYL 4-0 PS-2 Y496G

## (undated) DEVICE — SUT VICRYL 0 UR-6 J603H

## (undated) DEVICE — ANCHOR TISSUE RETRIEVAL SYSTEM, BAG SIZE 175 ML, PORT SIZE 10 MM: Brand: ANCHOR TISSUE RETRIEVAL SYSTEM

## (undated) DEVICE — SOLUTION  .9 1000ML BTL

## (undated) DEVICE — TROCAR: Brand: KII FIOS FIRST ENTRY

## (undated) DEVICE — STERILE POLYISOPRENE POWDER-FREE SURGICAL GLOVES: Brand: PROTEXIS

## (undated) DEVICE — CLOSURE EXOFIN 1.0ML

## (undated) DEVICE — 40580 - THE PINK PAD - ADVANCED TRENDELENBURG POSITIONING KIT: Brand: 40580 - THE PINK PAD - ADVANCED TRENDELENBURG POSITIONING KIT

## (undated) DEVICE — TROCAR: Brand: KII SLEEVE

## (undated) DEVICE — STERILE POLYISOPRENE POWDER-FREE SURGICAL GLOVES WITH EMOLLIENT COATING: Brand: PROTEXIS

## (undated) DEVICE — SUT VICRYL 0 J608H

## (undated) DEVICE — THE ECHELON, ECHELON ENDOPATH™ AND ECHELON FLEX™ FAMILIES OF ENDOSCOPIC LINEAR CUTTERS AND RELOADS ARE STERILE, SINGLE PATIENT USE INSTRUMENTS THAT SIMULTANEOUSLY CUT AND STAPLE TISSUE. THERE ARE SIX STAGGERED ROWS OF STAPLES, THREE ON EITHER SIDE OF THE CUT LINE. THE 45 MM INSTRUMENTS HAVE A STAPLE LINE THATIS APPROXIMATELY 45 MM LONG AND A CUT LINE THAT IS APPROXIMATELY 42 MM LONG. THE SHAFT CAN ROTATE FREELY IN BOTH DIRECTIONS AND AN ARTICULATION MECHANISM ON ARTICULATING INSTRUMENTS ENABLES BENDING THE DISTAL PORTIONOF THE SHAFT TO FACILITATE LATERAL ACCESS OF THE OPERATIVE SITE.THE INSTRUMENTS ARE SHIPPED WITHOUT A RELOAD AND MUST BE LOADED PRIOR TO USE. A STAPLE RETAINING CAP ON THE RELOAD PROTECTS THE STAPLE LEG POINTS DURING SHIPPING AND TRANSPORTATION. THE INSTRUMENTS’ LOCK-OUT FEATURE IS DESIGNED TO PREVENT A USED RELOAD FROM BEING REFIRED.: Brand: ECHELON ENDOPATH

## (undated) DEVICE — ENDOPATH ULTRA VERESS INSUFFLATION NEEDLES WITH LUER LOCK CONNECTORS: Brand: ENDOPATH

## (undated) DEVICE — THE ECHELON FLEX POWERED PLUS ARTICULATING ENDOSCOPIC LINEAR CUTTERS ARE STERILE, SINGLE PATIENT USE INSTRUMENTS THAT SIMULTANEOUSLYCUT AND STAPLE TISSUE. THERE ARE SIX STAGGERED ROWS OF STAPLES, THREE ON EITHER SIDE OF THE CUT LINE. THE ECHELON FLEX 45 POWERED PLUSINSTRUMENTS HAVE A STAPLE LINE THAT IS APPROXIMATELY 45 MM LONG AND A CUT LINE THAT IS APPROXIMATELY 42 MM LONG. THE SHAFT CAN ROTATE FREELYIN BOTH DIRECTIONS AND AN ARTICULATION MECHANISM ENABLES THE DISTAL PORTION OF THE SHAFT TO PIVOT TO FACILITATE LATERAL ACCESS TO THE OPERATIVESITE.THE INSTRUMENTS ARE PACKAGED WITH A PRIMARY LITHIUM BATTERY PACK THAT MUST BE INSTALLED PRIOR TO USE. THERE ARE SPECIFIC REQUIREMENTS FORDISPOSING OF THE BATTERY PACK. REFER TO THE BATTERY PACK DISPOSAL SECTION.THE INSTRUMENTS ARE PACKAGED WITHOUT A RELOAD AND MUST BE LOADED PRIOR TO USE. A STAPLE RETAINING CAP ON THE RELOAD PROTECTS THE STAPLE LEGPOINTS DURING SHIPPING AND TRANSPORTATION. THE INSTRUMENTS’ LOCK-OUT FEATURE IS DESIGNED TO PREVENT A USED OR IMPROPERLY INSTALLED RELOADFROM BEING REFIRED OR AN INSTRUMENT FROM BEING FIRED WITHOUT A RELOAD.: Brand: ECHELON FLEX

## (undated) DEVICE — LIGHT HANDLE

## (undated) DEVICE — DISPOSABLE GRASPER: Brand: EPIX LAPAROSCOPIC GRASPER

## (undated) DEVICE — APPLICATOR CHLORAPREP 26ML

## (undated) DEVICE — SLEEVE KENDALL SCD EXPRESS MED

## (undated) DEVICE — TRAY SURESTEP 16 BARDEX UMETR

## (undated) DEVICE — LAP CHOLE/APPY CDS-LF: Brand: MEDLINE INDUSTRIES, INC.

## (undated) NOTE — LETTER
2022    Return to Work    Name: Ness Vasquez        : 1985    To Whom It May Concern,    Ness Vasquez had surgery on 2022 and is:    Able to return to school / work with restrictions:  No lifting over: 20 lbs until 2022. The patient may return to work on 2022 with lifting restrictions as stated above. If there are any further questions, regarding this patient's care, please contact the patient directly.     Sincerely,    Mark Beatty PA-C

## (undated) NOTE — LETTER
Date & Time: 3/25/2025, 1:28 PM  Patient: Byron Chen  Encounter Provider(s):    Nano Bradley APRN       To Whom It May Concern:    Byron Chen was seen and treated in our department on 3/25/2025. He should not return to work until 3/27/25 .    If you have any questions or concerns, please do not hesitate to call.        _____________________________  Physician/APC Signature

## (undated) NOTE — LETTER
Date: 5/9/2025    Patient Name: Byron Chen          To Whom it may concern:    This letter has been written at the patient's request. The above patient was seen at [facility] for treatment of a medical condition.    This patient should be excused from attending work 5/9/25.      Sincerely,      Lelia Butler MD

## (undated) NOTE — LETTER
Date: 6/23/2025    Patient Name: Byron Chen          To Whom it may concern:    This letter has been written at the patient's request. The above patient was seen at [facility] for treatment of a medical condition.    This patient was seen today, 6/23/25 in clinic.         Sincerely,      Lelia Butler MD